# Patient Record
Sex: FEMALE | Race: WHITE | Employment: OTHER | URBAN - METROPOLITAN AREA
[De-identification: names, ages, dates, MRNs, and addresses within clinical notes are randomized per-mention and may not be internally consistent; named-entity substitution may affect disease eponyms.]

---

## 2017-01-04 ENCOUNTER — OFFICE VISIT (OUTPATIENT)
Dept: FAMILY MEDICINE CLINIC | Facility: CLINIC | Age: 72
End: 2017-01-04

## 2017-01-04 VITALS
DIASTOLIC BLOOD PRESSURE: 74 MMHG | HEIGHT: 60 IN | SYSTOLIC BLOOD PRESSURE: 134 MMHG | RESPIRATION RATE: 16 BRPM | HEART RATE: 82 BPM | BODY MASS INDEX: 21.4 KG/M2 | WEIGHT: 109 LBS | TEMPERATURE: 97.7 F | OXYGEN SATURATION: 97 %

## 2017-01-04 DIAGNOSIS — E78.5 HYPERLIPIDEMIA, UNSPECIFIED HYPERLIPIDEMIA TYPE: ICD-10-CM

## 2017-01-04 DIAGNOSIS — N20.0 RENAL CALCULI: ICD-10-CM

## 2017-01-04 DIAGNOSIS — B02.29 POSTHERPETIC NEURALGIA: Primary | ICD-10-CM

## 2017-01-04 RX ORDER — ACETAMINOPHEN/DIPHENHYDRAMINE 500MG-25MG
1 TABLET ORAL
COMMUNITY
End: 2017-04-07

## 2017-01-04 NOTE — PROGRESS NOTES
Ave Hoffman is a 80 y.o.  female presents today for office visit for back/stomach pain. Pt is in Room # 5.      1. Have you been to the ER, urgent care clinic since your last visit? Hospitalized since your last visit? No    2. Have you seen or consulted any other health care providers outside of the [address] [place] [address] since your last visit? Include any pap smears or colon screening.  No

## 2017-01-04 NOTE — PROGRESS NOTES
SUBJECTIVE:  Jeanne Castro is a 80y.o. year old female   Chief Complaint   Patient presents with    Follow-up       History of Present Illness: The singles rash is healing well. She finished Valtrex. The pain the zoster area is back. No fever, chills or N&V. She took Tylenol PM last night and since then the pain has subsided. Her nonspecific abdominal pain and constipation has resolved. Her BP was high in ER 9 days ago. Today it is better. She denies any H/O HTN. She is now following diet for lipids. She is going to take pravastatin. Past Medical History   Diagnosis Date    Calculus of kidney     H/O bronchitis      History reviewed. No pertinent past surgical history. Current Outpatient Prescriptions   Medication Sig    diphenhydrAMINE-acetaminophen (ACETAMINOPHEN PM)  mg tab Take 1 Tab by mouth two (2) times daily as needed for Pain.  pravastatin (PRAVACHOL) 20 mg tablet Take 1 Tab by mouth nightly. No current facility-administered medications for this visit. No Known Allergies     Family History   Problem Relation Age of Onset    Tremors Mother    [de-identified] Cancer Mother     No Known Problems Father     Emphysema Maternal Aunt     No Known Problems Maternal Uncle     No Known Problems Maternal Grandmother     Tremors Maternal Grandfather     Hypertension Son     Hypertension Son         Social History   Substance Use Topics    Smoking status: Former Smoker     Packs/day: 0.50     Years: 20.00     Quit date: 6/4/2015    Smokeless tobacco: Never Used    Alcohol use No       Review of Systems:   Constitutional: No fever, chills, night sweats, malaise, dizziness. Cardiovascular: No angina, palpitations, PND, orthopnea, lightheadedness, edema, claudication. Respiratory: No pleurisy, hemoptysis, unusual cough or sputum. Gastrointestinal: No nausea/ vomiting, KYE symptoms, melena, hematochezia.     Psychiatric: No agitation, confusion/disorientation, suicidal or homicidal ideation. Genitourinary: No dysuria, urgency, frequency, nocturia, hematuria, hesitancy, incontinence. Skin: otherwise no complaints. OBJECTIVE:  Physical Exam:   Constitutional: General Appearance:  well developed, well nourished, nontoxic, in no acute distress. Visit Vitals    /74 (BP 1 Location: Left arm, BP Patient Position: Sitting)    Pulse 82    Temp 97.7 °F (36.5 °C) (Oral)    Resp 16    Ht 5' (1.524 m)    Wt 109 lb (49.4 kg)    SpO2 97%    BMI 21.29 kg/m2     Pulmonary: Respiratory effort: normal; no dyspnea, no retractions, no accessory muscle use. Auscultation: normal & symmetrical air exchange; no rales, no rhonchi, no wheeze; no rubs  Cardiovascular: Palpation: PMI not displaced or enlarged, no thrills or heaves. Auscultation: RRR; no murmur, rubs or gallops. Extremities: no edema, no active varicosity. Gastrointestinal: Normal bowel sounds. No masses; no tenderness; no rebound/rigidity; no CVA tenderness. No hepatosplenomegaly. Psychiatric: Oriented to time, place and person. Musculoskeletal: NC/AT. Neck-supple. Skin: the Zosteriform rash on abdomen @ Lt T9 has healed.     Lab Results   Component Value Date/Time    WBC 8.5 12/26/2016 12:00 PM    HGB 16.0 12/26/2016 12:00 PM    HCT 45.8 12/26/2016 12:00 PM    PLATELET 803 11/66/0588 12:00 PM    MCV 85.3 12/26/2016 12:00 PM     Lab Results   Component Value Date/Time    Sodium 141 12/26/2016 01:15 PM    Potassium 4.0 12/26/2016 01:15 PM    Chloride 105 12/26/2016 01:15 PM    CO2 28 12/26/2016 01:15 PM    Anion gap 8 12/26/2016 01:15 PM    Glucose 92 12/26/2016 01:15 PM    BUN 17 12/26/2016 01:15 PM    Creatinine 0.83 12/26/2016 01:15 PM    BUN/Creatinine ratio 20 12/26/2016 01:15 PM    GFR est AA >60 12/26/2016 01:15 PM    GFR est non-AA >60 12/26/2016 01:15 PM    Calcium 9.0 12/26/2016 01:15 PM    Bilirubin, total 0.5 12/26/2016 01:15 PM    ALT 13 12/26/2016 01:15 PM    AST 15 12/26/2016 01:15 PM    Alk. phosphatase 73 12/26/2016 01:15 PM    Protein, total 7.7 12/26/2016 01:15 PM    Albumin 4.0 12/26/2016 01:15 PM    Globulin 3.7 12/26/2016 01:15 PM    A-G Ratio 1.1 12/26/2016 01:15 PM     Lab Results   Component Value Date/Time    Cholesterol, total 297 07/07/2016 12:16 PM    HDL Cholesterol 32 07/07/2016 12:16 PM    LDL, calculated 221 07/07/2016 12:16 PM    VLDL, calculated 44 07/07/2016 12:16 PM    Triglyceride 221 07/07/2016 12:16 PM     U/A- large blood. RBC 11-20. CT of abdomen/ pelvis (12/26/16): No acute findings to explain abdominal pain. Moderate amount of stool in the colon corresponding with reported constipation. ((KIDNEYS: Several bilateral renal cysts. Small nonobstructing calcification upper pole left kidney. Otherwise normal.))     ASSESSMENT:     1. Postherpetic neuralgia    2. Renal calculi    3. Hyperlipidemia, unspecified hyperlipidemia type    4. Elevated BP        PLAN:   Pharmacologic Management: Medications reviewed with the patient. Start on Pravastatin 20 nightly carefully. Continue PRN lactulose until seen by GI. Consider starting on Neurontin if pain returns. She is currently asymptomatic of renal stone- will follow up with . Orders Placed This Encounter    URINALYSIS W/ RFLX MICROSCOPIC    diphenhydrAMINE-acetaminophen (ACETAMINOPHEN PM)  mg tab     Other Instructions: Discussed DDx, follow-up & work-up. Discussed risk/benefit & side effect of treatment. Lipid lowering high fiber diet with fiber supplement discussed in details. Follow up visit as planned, prn sooner. Health risk from non adherence discussed. Patient voiced understanding. Follow-up Disposition:  Return in about 4 weeks (around 2/1/2017).     Gustabo Leung MD

## 2017-01-04 NOTE — MR AVS SNAPSHOT
Visit Information Date & Time Provider Department Dept. Phone Encounter #  
 1/4/2017  9:15 AM Liseth Meonn MD [facility] [phone] [id number] Follow-up Instructions Return in about 4 weeks (around 2/1/2017). Your Appointments 3/2/2017 10:30 AM  
Follow Up with Liseth Menno MD  
[facility] ([facility]) Appt Note: Return in about 2 months (around 2/28/2017) for fasting. [address] [place] [address] 83 [phone]  
[address] [facility] [address] Upcoming Health Maintenance Date Due DTaP/Tdap/Td series (1 - Tdap) 1/13/1967 ZOSTER VACCINE AGE 60> 1/13/2006 GLAUCOMA SCREENING Q2Y 1/13/2011 Pneumococcal 65+ Low/Medium Risk (1 of 2 - PCV13) 1/13/2011 INFLUENZA AGE 9 TO ADULT 8/1/2016 MEDICARE YEARLY EXAM 3/4/2017 FOBT Q 1 YEAR AGE 50-75 7/14/2017 BREAST CANCER SCRN MAMMOGRAM 3/10/2018 Allergies as of 1/4/2017  Review Complete On: 1/4/2017 By: Liseth Menon MD  
 No Known Allergies Current Immunizations  Never Reviewed No immunizations on file. Not reviewed this visit You Were Diagnosed With   
  
 Codes Comments Postherpetic neuralgia    -  Primary ICD-10-CM: B02.29 ICD-9-CM: 053.19 Renal calculi     ICD-10-CM: N20.0 ICD-9-CM: 592.0 Vitals BP Pulse Temp Resp Height(growth percentile) Weight(growth percentile) 134/74 (BP 1 Location: Left arm, BP Patient Position: Sitting) 82 97.7 °F (36.5 °C) (Oral) 16 5' (1.524 m) 109 lb (49.4 kg) SpO2 BMI OB Status Smoking Status 97% 21.29 kg/m2 Postmenopausal Former Smoker Vitals History BMI and BSA Data Body Mass Index Body Surface Area  
 21.29 kg/m 2 1.45 m 2 Preferred Pharmacy Pharmacy Name Phone [facility] [address] [phone] Your Updated Medication List  
  
   
This list is accurate as of: 1/4/17  9:58 AM.  Always use your most recent med list.  
  
  
  
  
 ACETAMINOPHEN PM  mg Tab Generic drug:  diphenhydrAMINE-acetaminophen Take 1 Tab by mouth two (2) times daily as needed for Pain.  
  
 pravastatin 20 mg tablet Commonly known as:  PRAVACHOL Take 1 Tab by mouth nightly. Follow-up Instructions Return in about 4 weeks (around 2/1/2017). To-Do List   
 01/04/2017 Lab:  URINALYSIS W/ RFLX MICROSCOPIC Introducing [facility]! [facility] introduces [employer] patient portal. Now you can access parts of your medical record, email your doctor's office, and request medication refills online. 1. In your internet browser, go to [email or web address] 2. Click on the First Time User? Click Here link in the Sign In box. You will see the New Member Sign Up page. 3. Enter your [employer] Access Code exactly as it appears below. You will not need to use this code after youve completed the sign-up process. If you do not sign up before the expiration date, you must request a new code. · [employer] Access Code: [email or web address] Expires: 3/13/2017 10:10 AM 
 
4. Enter the last four digits of your Social Security Number (xxxx) and Date of Birth (mm/dd/yyyy) as indicated and click Submit. You will be taken to the next sign-up page. 5. Create a [employer] ID. This will be your [employer] login ID and cannot be changed, so think of one that is secure and easy to remember. 6. Create a [employer] password. You can change your password at any time. 7. Enter your Password Reset Question and Answer. This can be used at a later time if you forget your password. 8. Enter your e-mail address. You will receive e-mail notification when new information is available in [address] 9. Click Sign Up. You can now view and download portions of your medical record. 10. Click the Download Summary menu link to download a portable copy of your medical information. If you have questions, please visit the Frequently Asked Questions section of the [employer] website. Remember, [employer] is NOT to be used for urgent needs. For medical emergencies, dial 911. Now available from your iPhone and Android! Please provide this summary of care documentation to your next provider. Your primary care clinician is listed as [address] [place] [address]. If you have any questions after today's visit, please call [phone].

## 2017-01-05 ENCOUNTER — TELEPHONE (OUTPATIENT)
Dept: FAMILY MEDICINE CLINIC | Facility: CLINIC | Age: 72
End: 2017-01-05

## 2017-01-05 LAB
APPEARANCE UR: CLEAR
BACTERIA #/AREA URNS HPF: ABNORMAL /[HPF]
BILIRUB UR QL STRIP: NEGATIVE
CASTS URNS QL MICRO: ABNORMAL /LPF
COLOR UR: YELLOW
EPI CELLS #/AREA URNS HPF: ABNORMAL /HPF
GLUCOSE UR QL: NEGATIVE
HGB UR QL STRIP: ABNORMAL
KETONES UR QL STRIP: NEGATIVE
LEUKOCYTE ESTERASE UR QL STRIP: NEGATIVE
MICRO URNS: ABNORMAL
MUCOUS THREADS URNS QL MICRO: PRESENT
NITRITE UR QL STRIP: NEGATIVE
PH UR STRIP: 6.5 [PH] (ref 5–7.5)
PROT UR QL STRIP: NEGATIVE
RBC #/AREA URNS HPF: ABNORMAL /HPF
SP GR UR: 1.01 (ref 1–1.03)
UROBILINOGEN UR STRIP-MCNC: 0.2 MG/DL (ref 0.2–1)
WBC #/AREA URNS HPF: ABNORMAL /HPF

## 2017-01-05 NOTE — PROGRESS NOTES
Urine is improved; but there is still minimal blood. This is likely due to kidney stone. Need to f/u with urologist as advised.

## 2017-01-05 NOTE — TELEPHONE ENCOUNTER
Spoke with pt in regards to lab results. Relayed 's notes. Pt acknowledges understanding and voices no concerns at this time.

## 2017-01-24 ENCOUNTER — OFFICE VISIT (OUTPATIENT)
Dept: UROLOGY | Age: 72
End: 2017-01-24

## 2017-01-24 VITALS
DIASTOLIC BLOOD PRESSURE: 71 MMHG | BODY MASS INDEX: 21.2 KG/M2 | HEART RATE: 80 BPM | WEIGHT: 108 LBS | SYSTOLIC BLOOD PRESSURE: 150 MMHG | HEIGHT: 60 IN | OXYGEN SATURATION: 96 %

## 2017-01-24 DIAGNOSIS — R31.29 MICROSCOPIC HEMATURIA: ICD-10-CM

## 2017-01-24 DIAGNOSIS — N20.0 KIDNEY STONES: Primary | ICD-10-CM

## 2017-01-24 DIAGNOSIS — M47.9 DEGENERATIVE JOINT DISEASE OF LOW BACK: ICD-10-CM

## 2017-01-24 LAB
BILIRUB UR QL STRIP: NEGATIVE
GLUCOSE UR-MCNC: NEGATIVE MG/DL
KETONES P FAST UR STRIP-MCNC: NEGATIVE MG/DL
PH UR STRIP: 5.5 [PH] (ref 4.6–8)
PROT UR QL STRIP: NEGATIVE MG/DL
SP GR UR STRIP: 1.01 (ref 1–1.03)
UA UROBILINOGEN AMB POC: NORMAL (ref 0.2–1)
URINALYSIS CLARITY POC: CLEAR
URINALYSIS COLOR POC: YELLOW
URINE BLOOD POC: NORMAL
URINE LEUKOCYTES POC: NEGATIVE
URINE NITRITES POC: NEGATIVE

## 2017-01-24 RX ORDER — LACTULOSE 10 G/15ML
SOLUTION ORAL; RECTAL AS NEEDED
COMMUNITY
End: 2017-10-06

## 2017-01-24 NOTE — MR AVS SNAPSHOT
Visit Information Date & Time Provider Department Dept. Phone Encounter #  
 1/24/2017 10:30 AM Lan Beyer, [place] [facility] [phone] [id number] Your Appointments 2/21/2017 10:30 AM  
Office Visit with Lan Beyer MD  
[facility]) Appt Note: check up [address] [address]  
[phone] [address] [phone]  
  
    
 3/2/2017 10:30 AM  
Follow Up with MD Anaya Pozo [address] ([facility]) Appt Note: Return in about 2 months (around 2/28/2017) for fasting. [address] [place], [place] [address] [phone]  
[address] [place] [address] Upcoming Health Maintenance Date Due DTaP/Tdap/Td series (1 - Tdap) 1/13/1967 ZOSTER VACCINE AGE 60> 1/13/2006 GLAUCOMA SCREENING Q2Y 1/13/2011 Pneumococcal 65+ Low/Medium Risk (1 of 2 - PCV13) 1/13/2011 INFLUENZA AGE 9 TO ADULT 8/1/2016 MEDICARE YEARLY EXAM 3/4/2017 FOBT Q 1 YEAR AGE 50-75 7/14/2017 BREAST CANCER SCRN MAMMOGRAM 3/10/2018 Allergies as of 1/24/2017  Review Complete On: 1/24/2017 By: Bella Rubi LPN No Known Allergies Current Immunizations  Never Reviewed No immunizations on file. Not reviewed this visit You Were Diagnosed With   
  
 Codes Comments Kidney stones    -  Primary ICD-10-CM: N20.0 ICD-9-CM: 592.0 Microscopic hematuria     ICD-10-CM: R31.29 ICD-9-CM: 599.72 Vitals BP Pulse Height(growth percentile) Weight(growth percentile) SpO2 BMI  
 150/71 (BP 1 Location: Left arm, BP Patient Position: Sitting) 80 5' (1.524 m) 108 lb (49 kg) 96% 21.09 kg/m2 OB Status Smoking Status Postmenopausal Former Smoker Vitals History BMI and BSA Data Body Mass Index Body Surface Area  21.09 kg/m 2 1.44 m 2  
  
  
 Preferred Pharmacy Pharmacy Name Phone [facility] [place], [address] [phone] Your Updated Medication List  
  
   
This list is accurate as of: 17 11:44 AM.  Always use your most recent med list.  
  
  
  
  
 ACETAMINOPHEN PM  mg Tab Generic drug:  diphenhydrAMINE-acetaminophen Take 1 Tab by mouth two (2) times daily as needed for Pain. GENERLAC 10 gram/15 mL solution Generic drug:  lactulose Take  by mouth three (3) times daily. pravastatin 20 mg tablet Commonly known as:  PRAVACHOL Take 1 Tab by mouth nightly. We Performed the Following AMB POC URINALYSIS DIP STICK AUTO W/O MICRO [92[address] CPT(R)] Patient Instructions [email or web address] Activation Thank you for requesting access to [facility]. Please follow the instructions below to securely access and download your online medical record. [facility] allows you to send messages to your doctor, view your test results, renew your prescriptions, schedule appointments, and more. How Do I Sign Up? 1. In your internet browser, go to [email or web address] 2. Click on the First Time User? Click Here link in the Sign In box. You will see the New Member Sign Up page. 3. Enter your [facility] Access Code exactly as it appears below. You will not need to use this code after youve completed the sign-up process. If you do not sign up before the expiration date, you must request a new code. [facility] Access Code: [email or web address] Expires: 3/13/2017 10:10 AM (This is the date your [facility] access code will ) 4. Enter the last four digits of your Social Security Number (xxxx) and Date of Birth (mm/dd/yyyy) as indicated and click Submit. You will be taken to the next sign-up page. 5. Create a [facility] ID.  This will be your [facility] login ID and cannot be changed, so think of one that is secure and easy to remember. 6. Create a [employer]i password. You can change your password at any time. 7. Enter your Password Reset Question and Answer. This can be used at a later time if you forget your password. 8. Enter your e-mail address. You will receive e-mail notification when new information is available in [address] 9. Click Sign Up. You can now view and download portions of your medical record. 10. Click the Download Summary menu link to download a portable copy of your medical information. Additional Information If you have questions, please visit the Frequently Asked Questions section of the [employer] website at [email or web address] Remember, [employer] is NOT to be used for urgent needs. For medical emergencies, dial 911. Kidney Stone: Care Instructions Your Care Instructions Kidney stones are formed when salts, minerals, and other substances normally found in the urine clump together. They can be as small as grains of sand or, rarely, as large as golf balls. While the stone is traveling through the ureter, which is the tube that carries urine from the kidney to the bladder, you will probably feel pain. The pain may be mild or very severe. You may also have some blood in your urine. As soon as the stone reaches the bladder, any intense pain should go away. If a stone is too large to pass on its own, you may need a medical procedure to help you pass the stone. The doctor has checked you carefully, but problems can develop later. If you notice any problems or new symptoms, get medical treatment right away. Follow-up care is a key part of your treatment and safety. Be sure to make and go to all appointments, and call your doctor if you are having problems. It's also a good idea to know your test results and keep a list of the medicines you take. How can you care for yourself at home? · Drink plenty of fluids, enough so that your urine is light yellow or clear like water. If you have kidney, heart, or liver disease and have to limit fluids, talk with your doctor before you increase the amount of fluids you drink. · Take pain medicines exactly as directed. Call your doctor if you think you are having a problem with your medicine. ¨ If the doctor gave you a prescription medicine for pain, take it as prescribed. ¨ If you are not taking a prescription pain medicine, ask your doctor if you can take an over-the-counter medicine. Read and follow all instructions on the label. · Your doctor may ask you to strain your urine so that you can collect your kidney stone when it passes. You can use a kitchen strainer or a tea strainer to catch the stone. Store it in a plastic bag until you see your doctor again. Preventing future kidney stones Some changes in your diet may help prevent kidney stones. Depending on the cause of your stones, your doctor may recommend that you: · Drink plenty of fluids, enough so that your urine is light yellow or clear like water. If you have kidney, heart, or liver disease and have to limit fluids, talk with your doctor before you increase the amount of fluids you drink. · Limit coffee, tea, and alcohol. Also avoid grapefruit juice. · Do not take more than the recommended daily dose of vitamins C and D. 
· Avoid antacids such as Gaviscon, Maalox, Mylanta, or Tums. · Limit the amount of salt (sodium) in your diet. · Eat a balanced diet that is not too high in protein. · Limit foods that are high in a substance called oxalate, which can cause kidney stones. These foods include dark green vegetables, rhubarb, chocolate, wheat bran, nuts, cranberries, and beans. When should you call for help? Call your doctor now or seek immediate medical care if: 
· You cannot keep down fluids. · Your pain gets worse. · You have a fever or chills. · You have new or worse pain in your back just below your rib cage (the flank area). · You have new or more blood in your urine. Watch closely for changes in your health, and be sure to contact your doctor if: 
· You do not get better as expected. Where can you learn more? Go to http://zia-jono.info/. Enter T270 in the search box to learn more about \"Kidney Stone: Care Instructions. \" Current as of: November 20, 2015 Content Version: 11.1 © 8952-0896 [employer]. Care instructions adapted under license by [employer] (which disclaims liability or warranty for this information). If you have questions about a medical condition or this instruction, always ask your healthcare professional. [address] any warranty or liability for your use of this information. Introducing [facility]! [facility] introduces [employer] patient portal. Now you can access parts of your medical record, email your doctor's office, and request medication refills online. 1. In your internet browser, go to [email or web address] 2. Click on the First Time User? Click Here link in the Sign In box. You will see the New Member Sign Up page. 3. Enter your A[employer] Access Code exactly as it appears below. You will not need to use this code after youve completed the sign-up process. If you do not sign up before the expiration date, you must request a new code. · [employer] Access Code: [email or web address] Expires: 3/13/2017 10:10 AM 
 
4. Enter the last four digits of your Social Security Number (xxxx) and Date of Birth (mm/dd/yyyy) as indicated and click Submit. You will be taken to the next sign-up page. 5. Create a Atterot ID. This will be your A[employer]ctions login ID and cannot be changed, so think of one that is secure and easy to remember. 6. Create a Atterot password. You can change your password at any time. 7. Enter your Password Reset Question and Answer. This can be used at a later time if you forget your password. 8. Enter your e-mail address. You will receive e-mail notification when new information is available in [address] 9. Click Sign Up. You can now view and download portions of your medical record. 10. Click the Download Summary menu link to download a portable copy of your medical information. If you have questions, please visit the Frequently Asked Questions section of the [employer] cities website. Remember, [employer] is NOT to be used for urgent needs. For medical emergencies, dial 911. Now available from your iPhone and Android! Please provide this summary of care documentation to your next provider. Your primary care clinician is listed as [address] [place] [address]. If you have any questions after today's visit, please call [phone].

## 2017-01-24 NOTE — PATIENT INSTRUCTIONS
Practice Management e-Tools Activation    Thank you for requesting access to Practice Management e-Tools. Please follow the instructions below to securely access and download your online medical record. Practice Management e-Tools allows you to send messages to your doctor, view your test results, renew your prescriptions, schedule appointments, and more. How Do I Sign Up? 1. In your internet browser, go to [email or web address]. 2. Click on the First Time User? Click Here link in the Sign In box. You will see the New Member Sign Up page. 3. Enter your Practice Management e-Tools Access Code exactly as it appears below. You will not need to use this code after youve completed the sign-up process. If you do not sign up before the expiration date, you must request a new code. Practice Management e-Tools Access Code: [email or web address]  Expires: 3/13/2017 10:10 AM (This is the date your Practice Management e-Tools access code will )    4. Enter the last four digits of your Social Security Number (xxxx) and Date of Birth (mm/dd/yyyy) as indicated and click Submit. You will be taken to the next sign-up page. 5. Create a Practice Management e-Tools ID. This will be your Practice Management e-Tools login ID and cannot be changed, so think of one that is secure and easy to remember. 6. Create a Practice Management e-Tools password. You can change your password at any time. 7. Enter your Password Reset Question and Answer. This can be used at a later time if you forget your password. 8. Enter your e-mail address. You will receive e-mail notification when new information is available in [address]. 9. Click Sign Up. You can now view and download portions of your medical record. 10. Click the Download Summary menu link to download a portable copy of your medical information. Additional Information    If you have questions, please visit the Frequently Asked Questions section of the Practice Management e-Tools website at [email or web address] Remember, Practice Management e-Tools is NOT to be used for urgent needs. For medical emergencies, dial 911.            Kidney Stone: Care Instructions  Your Care Instructions    Kidney stones are formed when salts, minerals, and other substances normally found in the urine clump together. They can be as small as grains of sand or, rarely, as large as golf balls. While the stone is traveling through the ureter, which is the tube that carries urine from the kidney to the bladder, you will probably feel pain. The pain may be mild or very severe. You may also have some blood in your urine. As soon as the stone reaches the bladder, any intense pain should go away. If a stone is too large to pass on its own, you may need a medical procedure to help you pass the stone. The doctor has checked you carefully, but problems can develop later. If you notice any problems or new symptoms, get medical treatment right away. Follow-up care is a key part of your treatment and safety. Be sure to make and go to all appointments, and call your doctor if you are having problems. It's also a good idea to know your test results and keep a list of the medicines you take. How can you care for yourself at home? · Drink plenty of fluids, enough so that your urine is light yellow or clear like water. If you have kidney, heart, or liver disease and have to limit fluids, talk with your doctor before you increase the amount of fluids you drink. · Take pain medicines exactly as directed. Call your doctor if you think you are having a problem with your medicine. ¨ If the doctor gave you a prescription medicine for pain, take it as prescribed. ¨ If you are not taking a prescription pain medicine, ask your doctor if you can take an over-the-counter medicine. Read and follow all instructions on the label. · Your doctor may ask you to strain your urine so that you can collect your kidney stone when it passes. You can use a kitchen strainer or a tea strainer to catch the stone. Store it in a plastic bag until you see your doctor again.   Preventing future kidney stones  Some changes in your diet may help prevent kidney stones. Depending on the cause of your stones, your doctor may recommend that you:  · Drink plenty of fluids, enough so that your urine is light yellow or clear like water. If you have kidney, heart, or liver disease and have to limit fluids, talk with your doctor before you increase the amount of fluids you drink. · Limit coffee, tea, and alcohol. Also avoid grapefruit juice. · Do not take more than the recommended daily dose of vitamins C and D.  · Avoid antacids such as Gaviscon, Maalox, Mylanta, or Tums. · Limit the amount of salt (sodium) in your diet. · Eat a balanced diet that is not too high in protein. · Limit foods that are high in a substance called oxalate, which can cause kidney stones. These foods include dark green vegetables, rhubarb, chocolate, wheat bran, nuts, cranberries, and beans. When should you call for help? Call your doctor now or seek immediate medical care if:  · You cannot keep down fluids. · Your pain gets worse. · You have a fever or chills. · You have new or worse pain in your back just below your rib cage (the flank area). · You have new or more blood in your urine. Watch closely for changes in your health, and be sure to contact your doctor if:  · You do not get better as expected. Where can you learn more? Go to http://zia-jono.info/. Enter R815 in the search box to learn more about \"Kidney Stone: Care Instructions. \"  Current as of: November 20, 2015  Content Version: 11.1  © 1290-4618 [employer]. Care instructions adapted under license by [employer] (which disclaims liability or warranty for this information). If you have questions about a medical condition or this instruction, always ask your healthcare professional. [address] any warranty or liability for your use of this information.

## 2017-01-24 NOTE — PROGRESS NOTES
Ms. Kayy Wagner has a reminder for a \"due or due soon\" health maintenance. I have asked that she contact her primary care provider for follow-up on this health maintenance.

## 2017-01-24 NOTE — PROGRESS NOTES
Chief Complaint   Patient presents with    New Patient    Kidney Stone    Microscopic Hematuria       HISTORY OF PRESENT ILLNESS:  Steve Sullivan is a 71 y.o. female comes in today having been seen earlier for severe back pain, abdominal pain, chronic constipation. As part of her workup, she had a CT urogram.  This study showed some small subcentimeter cysts in each kidney and some small calyceal stones. On my evaluation of the study, she also has suggestive demineralization of her back and clear loss of disc space between L4 and L5. No gross hematuria here in the office and she has no significant urinary symptoms. She has not had any previous stone disease that she is aware of. She is however a prior smoker and has been found to have some moderate microhematuria. Past Medical History   Diagnosis Date    Calculus of kidney     H/O bronchitis     Shingles        History reviewed. No pertinent past surgical history. Social History   Substance Use Topics    Smoking status: Former Smoker     Packs/day: 0.50     Years: 20.00     Quit date: 6/4/2015    Smokeless tobacco: Never Used    Alcohol use No       No Known Allergies    Family History   Problem Relation Age of Onset    Tremors Mother     Cancer Mother     No Known Problems Father     Emphysema Maternal Aunt     No Known Problems Maternal Uncle     No Known Problems Maternal Grandmother     Tremors Maternal Grandfather     Hypertension Son     Hypertension Son        Current Outpatient Prescriptions   Medication Sig Dispense Refill    lactulose (GENERLAC) 10 gram/15 mL solution Take  by mouth three (3) times daily.  diphenhydrAMINE-acetaminophen (ACETAMINOPHEN PM)  mg tab Take 1 Tab by mouth two (2) times daily as needed for Pain.  pravastatin (PRAVACHOL) 20 mg tablet Take 1 Tab by mouth nightly. 30 Tab 2           REVIEW OF SYSTEMS:   Documented on the chart.       PHYSICAL EXAMINATION:     Visit Vitals    /71 (BP 1 Location: Left arm, BP Patient Position: Sitting)    Pulse 80    Ht 5' (1.524 m)    Wt 108 lb (49 kg)    SpO2 96%    BMI 21.09 kg/m2     Constitutional: Well developed, well-nourished female in no acute distress. CV:  No peripheral swelling noted  Respiratory: No respiratory distress or difficulties  Abdomen:  Soft and nontender. No masses. No hepatosplenomegaly.  Female:  No CVA tenderness. Skin:  Normal color. No evidence of jaundice. Neuro/Psych:  Patient with appropriate affect. Alert and oriented. Lymphatic:   No enlargement of supraclavicular lymph nodes. Results for orders placed or performed in visit on 01/24/17   AMB POC URINALYSIS DIP STICK AUTO W/O MICRO   Result Value Ref Range    Color (UA POC) Yellow     Clarity (UA POC) Clear     Glucose (UA POC) Negative Negative    Bilirubin (UA POC) Negative Negative    Ketones (UA POC) Negative Negative    Specific gravity (UA POC) 1.015 1.001 - 1.035    Blood (UA POC) 2+ Negative    pH (UA POC) 5.5 4.6 - 8.0    Protein (UA POC) Negative Negative mg/dL    Urobilinogen (UA POC) 0.2 mg/dL 0.2 - 1    Nitrites (UA POC) Negative Negative    Leukocyte esterase (UA POC) Negative Negative         REVIEW OF LABS AND IMAGING:      Imaging Report Reviewed? YES      Images Reviewed? YES           Other Lab Data Reviewed? YES    ASSESSMENT:     ICD-10-CM ICD-9-CM    1. Kidney stones N20.0 592.0 AMB POC URINALYSIS DIP STICK AUTO W/O MICRO   2. Microscopic hematuria R31.29 599.72 AMB POC URINALYSIS DIP STICK AUTO W/O MICRO      CYTOLOGY NON-GYN   3. Degenerative joint disease of low back M47.9 721.90                 PLAN/DISCUSSION: I have reviewed her CT scan with her and shown her the defects mentioned on it and I have also shown her back problems to her. In summary I think she needs to keep up with her pending gastroenterology consult and probably have a colonoscopy to explain her lower abdominal pains and constipation.   As far as her urinary tract is concerned, I do not see anything significantly wrong with either kidney and I see no signs of any obstructive uropathy suggestive of a ureteral stone. However because of her long history of smoking and her microhematuria I am going to send her urine off for some urinary cytology studies and I am going to see her back in about 3 or 4 weeks and recheck her urine. I think regardless of the pathology results, she is going to need cystoscopy because of her smoking history. Patient voices understanding and agreement to the plan. Tomeka Jack MD on 1/24/2017           Please note: This document has been produced using voice recognition software. Unrecognized errors in transcription may be present.

## 2017-01-30 ENCOUNTER — PATIENT OUTREACH (OUTPATIENT)
Dept: FAMILY MEDICINE CLINIC | Facility: CLINIC | Age: 72
End: 2017-01-30

## 2017-01-30 NOTE — PROGRESS NOTES
Patient was admitted to [facility] [place] 12/26/16 -12/26/16 for Constipation. Reached patient on phone and verified identity using name and date of birth. Introduced self/role and purpose of call. Patient's sounds in a hurry to end the phone conversation. Patient stated \" I'm doing fine\". Patient denied Chest Pain, Shortness of breath. Abdominal Pain, Nausea and Vomiting. Noted no hospitalization  admission post 30 days from discharge date 12/26/16. This episode is closed. ED/Hospital episode resolved. Opportunity to ask questions was provided. Contact information was provided for future reference, assistance, concerns, or further questions. Patient voiced  other  concern, needs, and/or assistance at this time.

## 2017-01-31 ENCOUNTER — PATIENT OUTREACH (OUTPATIENT)
Dept: FAMILY MEDICINE CLINIC | Facility: CLINIC | Age: 72
End: 2017-01-31

## 2017-01-31 NOTE — PROGRESS NOTES
Patient is requesting writer to call and schedule her an appoinmentt to see her gastro doctor. Patient stated \" I don't like making appointment\". Called Dr. Dayana Pedro office and spoke to Ms. Jessica Heller. Appointment is schedule on 2/10/17 at 2:10 pm. Called patient to make her aware of schedule appointment. Instructed patient to arrived at her appointment at 1:50pm  and to ensure to bring her current medication lists, Photo ID, and insurance card. Patient verbalized understanding.

## 2017-04-07 ENCOUNTER — OFFICE VISIT (OUTPATIENT)
Dept: FAMILY MEDICINE CLINIC | Facility: CLINIC | Age: 72
End: 2017-04-07

## 2017-04-07 VITALS
SYSTOLIC BLOOD PRESSURE: 108 MMHG | RESPIRATION RATE: 15 BRPM | BODY MASS INDEX: 20.81 KG/M2 | DIASTOLIC BLOOD PRESSURE: 78 MMHG | WEIGHT: 106 LBS | OXYGEN SATURATION: 97 % | HEART RATE: 76 BPM | TEMPERATURE: 97.8 F | HEIGHT: 60 IN

## 2017-04-07 DIAGNOSIS — Z00.00 MEDICARE ANNUAL WELLNESS VISIT, SUBSEQUENT: ICD-10-CM

## 2017-04-07 DIAGNOSIS — B02.29 POSTHERPETIC NEURALGIA: ICD-10-CM

## 2017-04-07 DIAGNOSIS — E78.5 HYPERLIPIDEMIA, UNSPECIFIED HYPERLIPIDEMIA TYPE: Primary | ICD-10-CM

## 2017-04-07 NOTE — MR AVS SNAPSHOT
Visit Information Date & Time Provider Department Dept. Phone Encounter #  
 4/7/2017  9:30 AM Kateryna Lopez MD [facility] [phone] [id number] Upcoming Health Maintenance Date Due DTaP/Tdap/Td series (1 - Tdap) 1/13/1966 MEDICARE YEARLY EXAM 3/4/2017 FOBT Q 1 YEAR AGE 50-75 7/14/2017 BREAST CANCER SCRN MAMMOGRAM 3/10/2018 Pneumococcal 65+ Low/Medium Risk (2 of 2 - PPSV23) 4/7/2018 GLAUCOMA SCREENING Q2Y 4/7/2019 Allergies as of 4/7/2017  Review Complete On: 4/7/2017 By: Kateryna Lopez MD  
 No Known Allergies Current Immunizations  Never Reviewed No immunizations on file. Not reviewed this visit You Were Diagnosed With   
  
 Codes Comments Hyperlipidemia, unspecified hyperlipidemia type    -  Primary ICD-10-CM: E78.5 ICD-9-CM: 272.4 Vitals BP Pulse Temp Resp Height(growth percentile) Weight(growth percentile) 108/78 (BP 1 Location: Left arm, BP Patient Position: Sitting) 76 97.8 °F (36.6 °C) (Oral) 15 5' (1.524 m) 106 lb (48.1 kg) SpO2 BMI OB Status Smoking Status 97% 20.7 kg/m2 Postmenopausal Former Smoker BMI and BSA Data Body Mass Index Body Surface Area 20.7 kg/m 2 1.43 m 2 Preferred Pharmacy Pharmacy Name Phone [facility] [address] [phone] Your Updated Medication List  
  
   
This list is accurate as of: 4/7/17 10:12 AM.  Always use your most recent med list.  
  
  
  
  
 GENERLAC 10 gram/15 mL solution Generic drug:  lactulose Take  by mouth three (3) times daily. pravastatin 20 mg tablet Commonly known as:  PRAVACHOL Take 1 Tab by mouth nightly. To-Do List   
 04/07/2017 Lab:  ALT   
  
 04/07/2017 Lab:  AST   
  
 04/07/2017 Lab:  LIPID PANEL Patient Instructions Schedule of Personalized Health Plan for Arabella Cruz (Provide Copy to Patient) 04/07/17 The best way to stay healthy is to live a healthy lifestyle. A healthy lifestyle includes regular exercise, eating a well-balanced diet, keeping a healthy weight and not smoking. Regular physical exams and screening tests are another important way to take care of yourself. Preventive exams provided by health care providers can find health problems early when treatment works best and can keep you from getting certain diseases or illnesses. Preventive services include exams, lab tests, screenings, shots, monitoring and information to help you take care of your own health. All people over 65 should have a pneumonia shot. Pneumonia shots are usually only needed once in a lifetime unless your doctor decides differently. All people over 65 should have a yearly flu shot. People over 65 are at medium to high risk for Hepatitis B. Three shots are needed for complete protection. In addition to your physical exam, some screening tests are recommended: 
 
 
Screening for Breast Cancer is recommended yearly with a mammogram. 
 
Screening for Cervical Cancer is recommended every two years (annually for certain risk factors, such as previous history of STD or abnormal PAP in past 7 years), with a Pelvic Exam with PAP 
 
 Here is a list of your current Health Maintenance items with a due date: 
Health Maintenance Topic Date Due  
 DTaP/Tdap/Td series (1 - Tdap) 01/13/1966  MEDICARE YEARLY EXAM  03/04/2017  
 FOBT Q 1 YEAR AGE 50-75  07/14/2017  BREAST CANCER SCRN MAMMOGRAM  03/10/2018  Pneumococcal 65+ Low/Medium Risk (2 of 2 - PPSV23) 04/07/2018  GLAUCOMA SCREENING Q2Y  04/07/2019  Hepatitis C Screening  Completed  OSTEOPOROSIS SCREENING (DEXA)  Completed  ZOSTER VACCINE AGE 60>  Addressed  INFLUENZA AGE 9 TO ADULT  Addressed Advance Care Planning: Care Instructions Your Care Instructions It can be hard to live with an illness that cannot be cured. But if your health is getting worse, you may want to make decisions about end-of-life care. Planning for the end of your life does not mean that you are giving up. It is a way to make sure that your wishes are met. Clearly stating your wishes can make it easier for your loved ones. Making plans while you are still able may also ease your mind and make your final days less stressful and more meaningful. Follow-up care is a key part of your treatment and safety. Be sure to make and go to all appointments, and call your doctor if you are having problems. It's also a good idea to know your test results and keep a list of the medicines you take. What can you do to plan for the end of life? · You can bring these issues up with your doctor. You do not need to wait until your doctor starts the conversation. You might start with \"I would not be willing to live with . Yousif Alcantar \" When you complete this sentence it helps your doctor understand your wishes. · Talk openly and honestly with your doctor. This is the best way to understand the decisions you will need to make as your health changes. Know that you can always change your mind. · Ask your doctor about commonly used life-support measures.  These include tube feedings, breathing machines, and fluids given through a vein (IV). Understanding these treatments will help you decide whether you want them. · You may choose to have these life-supporting treatments for a limited time. This allows a trial period to see whether they will help you. You may also decide that you want your doctor to take only certain measures to keep you alive. It is important to spell out these conditions so that your doctor and family understand them. · Talk to your doctor about how long you are likely to live. He or she may be able to give you an idea of what usually happens with your specific illness. · Think about preparing papers that state your wishes. This way there will not be any confusion about what you want. You can change your instructions at any time. Which papers should you prepare? Advance directives are legal papers that tell doctors how you want to be cared for at the end of your life. You do not need a  to write these papers. Ask your doctor or your state health department for information on how to write your advance directives. They may have the forms for each of these types of papers. Make sure your doctor has a copy of these on file, and give a copy to a family member or close friend. · Consider a do-not-resuscitate order (DNR). This order asks that no extra treatments be done if your heart stops or you stop breathing. Extra treatments may include cardiopulmonary resuscitation (CPR), electrical shock to restart your heart, or a machine to breathe for you. If you decide to have a DNR order, ask your doctor to explain and write it. Place the order in your home where everyone can easily see it. · Consider a living will. A living will explains your wishes about life support and other treatments at the end of your life if you become unable to speak for yourself.  Living cortez tell doctors to use or not use treatments that would keep you alive. You must have one or two witnesses or a notary present when you sign this form. · Consider a durable power of  for health care. This allows you to name a person to make decisions about your care if you are not able to. Most people ask a close friend or family member. Talk to this person about the kinds of treatments you want and those that you do not want. Make sure this person understands your wishes. These legal papers are simple to change. Tell your doctor what you want to change, and ask him or her to make a note in your medical file. Give your family updated copies of the papers. Where can you learn more? Go to http://zia-jono.info/. Enter P184 in the search box to learn more about \"Advance Care Planning: Care Instructions. \" Current as of: November 17, 2016 Content Version: 11.2 © 3231-0629 [employer]. Care instructions adapted under license by [employer] (which disclaims liability or warranty for this information). If you have questions about a medical condition or this instruction, always ask your healthcare professional. [address] any warranty or liability for your use of this information. Deciding About Life-Prolonging Treatment What is life-prolonging treatment? There are many kinds of treatment that can help you live longer. These may be needed for only a short time until your illness improves. Or you may use them over the long term to help keep you alive. Some treatments include the use of: · Medicines to slow the progress of certain diseases, such as heart disease, diabetes, cancer, AIDS, or Alzheimer's disease. · Antibiotics to treat serious infections, such as pneumonia. · Dialysis to clean your blood if your kidneys stop working.  
· A breathing machine to help you breathe if you can't breathe on your own. This machine pumps air into your lungs through a tube put into your throat. · A feeding tube or an intravenous (IV) line to give you food and fluids if you can't eat or drink. · Cardiopulmonary resuscitation (CPR) to try to restart your heart. The decision to receive treatments that may help you live longer is a personal one. You may want your doctor to do everything possible to keep you alive, even when your chance for recovery is small. Or you may choose to only have care to manage your pain and other symptoms. What are key points about this decision? · If there is a good chance that your illness can be cured or managed, your doctor may advise you to first try available treatments. If these don't work, then you might think about stopping treatment. · If you stop treatment, you will still receive care that focuses on pain relief and comfort. · A decision to stop treatment that keeps you alive does not have to be permanent. You can always change your mind if your health starts to improve. · Even though treatment focuses on helping you live longer, it may cause side effects that can greatly affect your quality of life. And it could affect how you spend time with your family and friends. · If you still have personal goals that you want to pursue, you may want treatment that keeps you alive long enough to reach them. Why might you choose life-prolonging treatment? · There is a good chance that your illness can be cured or managed. · You think you can manage the possible side effects of treatment. · You don't think treatment will get in the way of your quality of life. · You have personal goals that you still want to pursue and achieve. Why might you choose to stop life-prolonging treatment? · Your chance of surviving your illness is very low. · You have tried all possible treatments for your illness, but they have not helped. · You can no longer deal with the side effects of treatment. · You have already met the goals you set out to achieve in your life. Your decision Thinking about the facts and your feelings can help you make a decision that is right for you. Be sure you understand the benefits and risks of your options, and think about what else you need to do before you make the decision. Where can you learn more? Go to http://zia-jono.info/. Enter O354 in the search box to learn more about \"Deciding About Life-Prolonging Treatment. \" Current as of: February 24, 2016 Content Version: 11.2 © 7932-5724 [employer]. Care instructions adapted under license by [employer] (which disclaims liability or warranty for this information). If you have questions about a medical condition or this instruction, always ask your healthcare professional. [address] any warranty or liability for your use of this information. [address] What is a living will? A living will is a legal form you use to write down the kind of care you want at the end of your life. It is used by the health professionals who will treat you if you aren't able to decide for yourself. If you put your wishes in writing, your loved ones and others will know what kind of care you want. They won't need to guess. This can ease your mind and be helpful to others. A living will is not the same as an estate or property will. An estate will explains what you want to happen with your money and property after you die. Is a living will a legal document? A living will is a legal document. Each state has its own laws about living cortez. If you move to another state, make sure that your living will is legal in the state where you now live. Or you might use a universal form that has been approved by many states. This kind of form can sometimes be completed and stored online.  Your electronic copy will then be available wherever you have a connection to the Internet. In most cases, doctors will respect your wishes even if you have a form from a different state. · You don't need an  to complete a living will. But legal advice can be helpful if your state's laws are unclear, your health history is complicated, or your family can't agree on what should be in your living will. · You can change your living will at any time. Some people find that their wishes about end-of-life care change as their health changes. · In addition to making a living will, think about completing a medical power of  form. This form lets you name the person you want to make end-of-life treatment decisions for you (your \"health care agent\") if you're not able to. Many hospitals and nursing homes will give you the forms you need to complete a living will and a medical power of . · Your living will is used only if you can't make or communicate decisions for yourself anymore. If you become able to make decisions again, you can accept or refuse any treatment, no matter what you wrote in your living will. · Your state may offer an online registry. This is a place where you can store your living will online so the doctors and nurses who need to treat you can find it right away. What should you think about when creating a living will? Talk about your end-of-life wishes with your family members and your doctor. Let them know what you want. That way the people making decisions for you won't be surprised by your choices. Think about these questions as you make your living will: · Do you know enough about life support methods that might be used? If not, talk to your doctor so you know what might be done if you can't breathe on your own, your heart stops, or you're unable to swallow.  
· What things would you still want to be able to do after you receive life-support methods? Would you want to be able to walk? To speak? To eat on your own? To live without the help of machines? · If you have a choice, where do you want to be cared for? In your home? At a hospital or nursing home? · Do you want certain Advent practices performed if you become very ill? · If you have a choice at the end of your life, where would you prefer to die? At home? In a hospital or nursing home? Somewhere else? · Would you prefer to be buried or cremated? · Do you want your organs to be donated after you die? What should you do with your living will? · Make sure that your family members and your health care agent have copies of your living will. · Give your doctor a copy of your living will to keep in your medical record. If you have more than one doctor, make sure that each one has a copy. · You may want to put a copy of your living will where it can be easily found. Where can you learn more? Go to http://zia-jono.info/. Enter G822 in the search box to learn more about \"Learning About Living Charu. \" Current as of: February 24, 2016 Content Version: 11.2 © 1376-7540 [employer]. Care instructions adapted under license by [employer] (which disclaims liability or warranty for this information). If you have questions about a medical condition or this instruction, always ask your healthcare professional. [place] [address] any warranty or liability for your use of this information. Advance Directives: Care Instructions Your Care Instructions An advance directive is a legal way to state your wishes at the end of your life. It tells your family and your doctor what to do if you can no longer say what you want. There are two main types of advance directives. You can change them any time that your wishes change.  
· A living will tells your family and your doctor your wishes about life support and other treatment. · A durable power of  for health care lets you name a person to make treatment decisions for you when you can't speak for yourself. This person is called a health care agent. If you do not have an advance directive, decisions about your medical care may be made by a doctor or a  who doesn't know you. It may help to think of an advance directive as a gift to the people who care for you. If you have one, they won't have to make tough decisions by themselves. Follow-up care is a key part of your treatment and safety. Be sure to make and go to all appointments, and call your doctor if you are having problems. It's also a good idea to know your test results and keep a list of the medicines you take. How can you care for yourself at home? · Discuss your wishes with your loved ones and your doctor. This way, there are no surprises. · Many states have a unique form. Or you might use a universal form that has been approved by many states. This kind of form can sometimes be completed and stored online. Your electronic copy will then be available wherever you have a connection to the Internet. In most cases, doctors will respect your wishes even if you have a form from a different state. · You don't need a  to do an advance directive. But you may want to get legal advice. · Think about these questions when you prepare an advance directive: ¨ Who do you want to make decisions about your medical care if you are not able to? Many people choose a family member or close friend. ¨ Do you know enough about life support methods that might be used? If not, talk to your doctor so you understand. ¨ What are you most afraid of that might happen? You might be afraid of having pain, losing your independence, or being kept alive by machines. ¨ Where would you prefer to die? Choices include your home, a hospital, or a nursing home. ¨ Would you like to have information about hospice care to support you and your family? ¨ Do you want to donate organs when you die? ¨ Do you want certain Orthodox practices performed before you die? If so, put your wishes in the advance directive. · Read your advance directive every year, and make changes as needed. When should you call for help? Be sure to contact your doctor if you have any questions. Where can you learn more? Go to http://zia-jono.info/. Enter R264 in the search box to learn more about \"Advance Directives: Care Instructions. \" Current as of: November 17, 2016 Content Version: 11.2 © 9248-2581 [employer]. Care instructions adapted under license by [employer] (which disclaims liability or warranty for this information). If you have questions about a medical condition or this instruction, always ask your healthcare professional. [address] any warranty or liability for your use of this information. Learning About Durable Power of  for Health Care What is a durable power of  for health care? A durable power of  for health care is one type of the legal forms called advance directives. It lets you decide who you want to make treatment decisions for you if you cannot speak or decide for yourself. The person you choose is called your health care agent. Another type of advance directive is a living will. It lets you write down what kinds of treatment or life support you want or do not want. What should you think about when choosing a health care agent? Choose your health care agent carefully. This person may or may not be a family member. Talk to the person before you make your final decision. Make sure he or she is comfortable with this responsibility. It's a good idea to choose someone who: · Is at least 25years old. · Knows you well and understands what makes life meaningful for you. · Understands your Anglican and moral values. · Will do what you want, not what he or she wants. · Will be able to make difficult choices at a stressful time. · Will be able to refuse or stop treatment, if that is what you would want, even if you could die. · Will be firm and confident with health professionals if needed. · Will ask questions to get necessary information. · Lives near you or agrees to travel to you if needed. Your family may help you make medical decisions while you can still be part of that process. But it is important to choose one person to be your health care agent in case you are not able to make decisions for yourself. If you don't fill out the legal form and name a health care agent, the decisions your family can make may be limited. Who will make decisions for you if you do not have a health care agent? If you don't have a health care agent or a living will, your family members may disagree about your medical care. And then some medical professionals who may not know you as well might have to make decisions for you. In some cases, a  makes the decisions. When you name a health care agent, it is very clear who has the power to make health decisions for you. How do you name a health care agent? You name your health care agent on a legal form. It is usually called a durable power of  for health care. Ask your hospital, state bar association, or office on aging where to find these forms. You must sign the form to make it legal. Some states require you to get the form notarized. This means that a person called a  watches you sign the form and then he or she signs the form. Some states also require that two or more witnesses sign the form. Be sure to tell your family members and doctors who your health care agent is. Keep your forms in a safe place. But make sure that your loved ones know where the forms are. This could be in your desk where you keep other important papers. Make sure your doctor has a copy of your forms. Where can you learn more? Go to http://zia-jono.info/. Enter [id number] in the search box to learn more about \"Learning About Durable Power of  for [facility]. \" Current as of: November 17, 2016 Content Version: 11.2 © 5112-2577 [employer]. Care instructions adapted under license by [employer] (which disclaims liability or warranty for this information). If you have questions about a medical condition or this instruction, always ask your healthcare professional. [address] any warranty or liability for your use of this information. Preventing Falls: Care Instructions Your Care Instructions Getting around your home safely can be a challenge if you have injuries or health problems that make it easy for you to fall. Loose rugs and furniture in walkways are among the dangers for many older people who have problems walking or who have poor eyesight. People who have conditions such as arthritis, osteoporosis, or dementia also have to be careful not to fall. You can make your home safer with a few simple measures. Follow-up care is a key part of your treatment and safety. Be sure to make and go to all appointments, and call your doctor if you are having problems. It's also a good idea to know your test results and keep a list of the medicines you take. How can you care for yourself at home? Taking care of yourself · You may get dizzy if you do not drink enough water. To prevent dehydration, drink plenty of fluids, enough so that your urine is light yellow or clear like water. Choose water and other caffeine-free clear liquids.  If you have kidney, heart, or liver disease and have to limit fluids, talk with your doctor before you increase the amount of fluids you drink. · Exercise regularly to improve your strength, muscle tone, and balance. Walk if you can. Swimming may be a good choice if you cannot walk easily. · Have your vision and hearing checked each year or any time you notice a change. If you have trouble seeing and hearing, you might not be able to avoid objects and could lose your balance. · Know the side effects of the medicines you take. Ask your doctor or pharmacist whether the medicines you take can affect your balance. Sleeping pills or sedatives can affect your balance. · Limit the amount of alcohol you drink. Alcohol can impair your balance and other senses. · Ask your doctor whether calluses or corns on your feet need to be removed. If you wear loose-fitting shoes because of calluses or corns, you can lose your balance and fall. · Talk to your doctor if you have numbness in your feet. Preventing falls at home · Remove raised doorway thresholds, throw rugs, and clutter. Repair loose carpet or raised areas in the floor. · Move furniture and electrical cords to keep them out of walking paths. · Use nonskid floor wax, and wipe up spills right away, especially on ceramic tile floors. · If you use a walker or cane, put rubber tips on it. If you use crutches, clean the bottoms of them regularly with an abrasive pad, such as steel wool. · Keep your house well lit, especially [place], and outside walkways. Use night-lights in areas such as hallways and bathrooms. Add extra light switches or use remote switches (such as switches that go on or off when you clap your hands) to make it easier to turn lights on if you have to get up during the night. · Install sturdy handrails on stairways. · Move items in your cabinets so that the things you use a lot are on the lower shelves (about waist level). · Keep a cordless phone and a flashlight with new batteries by your bed. If possible, put a phone in each of the main rooms of your house, or carry a cell phone in case you fall and cannot reach a phone. Or, you can wear a device around your neck or wrist. You push a button that sends a signal for help. · Wear low-heeled shoes that fit well and give your feet good support. Use footwear with nonskid soles. Check the heels and soles of your shoes for wear. Repair or replace worn heels or soles. · Do not wear socks without shoes on wood floors. · Walk on the grass when the sidewalks are slippery. If you live in an area that gets snow and ice in the winter, sprinkle salt on slippery steps and sidewalks. Preventing falls in the bath · Install grab bars and nonskid mats inside and outside your shower or tub and near the toilet and sinks. · Use shower chairs and bath benches. · Use a hand-held shower head that will allow you to sit while showering. · Get into a tub or shower by putting the weaker leg in first. Get out of a tub or shower with your strong side first. 
· Repair loose toilet seats and consider installing a raised toilet seat to make getting on and off the toilet easier. · Keep your bathroom door unlocked while you are in the shower. Where can you learn more? Go to http://zia-jono.info/. Enter 0476 [id number] in the search box to learn more about \"Preventing Falls: Care Instructions. \" Current as of: August 4, 2016 Content Version: 11.2 © 4701-7733 [employer]. Care instructions adapted under license by [employer] (which disclaims liability or warranty for this information). If you have questions about a medical condition or this instruction, always ask your healthcare professional. [place] [address] any warranty or liability for your use of this information. Preventing Outdoor Falls: Care Instructions Your Care Instructions Worries about falls don't need to keep you indoors.  Outdoor activities like walking have big benefits for your health. You will need to watch your step and learn a few safety measures. If you are worried about having a fall outdoors, ask your doctor about exercises, classes, or physical therapy that may help. You can learn ways to gain strength, flexibility, and balance. Ask if it might help to use a cane or walker. You can make your time outdoors safer with a few simple measures. Follow-up care is a key part of your treatment and safety. Be sure to make and go to all appointments, and call your doctor if you are having problems. It's also a good idea to know your test results and keep a list of the medicines you take. How can you prevent falls outdoors? · Wear shoes with firm soles and low heels. If you have to walk on an icy surface, use grippers that can be worn over your shoes in bad weather. · Be extra careful if weather is bad. Walk on the grass when the sidewalks are slick. If you live in a place that gets snow and ice in the winter, sprinkle salt on slippery stairs and sidewalks. · Be careful getting on or off buses and trains or getting in and out of cars. If handrails are available, use them. · Be careful when you cross the street. Look for crosswalks or places where curb cuts or ramps are present. · Try not to hurry, especially if you are carrying something. · Be cautious in parking lots or garages. There may be curbs or changes in pavement, or the height of the pavement may vary. · Make sure to wear the correct eyeglasses, if you need them. Reading glasses or bifocals can make it harder to see hazards that might be in your way. · If you are walking outdoors for exercise, try to: 
¨ Walk in well-lighted, well-maintained areas. These include high school or college tracks, shopping malls, and public spaces. ¨ Walk with a partner.  
¨ Watch out for cracked sidewalks, curbs, changes in the height of the pavement, exposed tree roots, and debris such as fallen leaves or branches. Where can you learn more? Go to http://zia-jono.info/. Enter N586 in the search box to learn more about \"Preventing Outdoor Falls: Care Instructions. \" Current as of: August 4, 2016 Content Version: 11.2 © 1922-9083 [employer]. Care instructions adapted under license by [employer] (which disclaims liability or warranty for this information). If you have questions about a medical condition or this instruction, always ask your healthcare professional. [address] any warranty or liability for your use of this information. How to Get Up Safely After a Fall: Care Instructions Your Care Instructions If you have injuries, health problems, or other reasons that may make it easy for you to fall at home, it is a good idea to learn how to get up safely after a fall. Learning how to get up correctly can help you avoid making an injury worse. Also, knowing what to do if you cannot get up can help you stay safe until help arrives. Follow-up care is a key part of your treatment and safety. Be sure to make and go to all appointments, and call your doctor if you are having problems. It's also a good idea to know your test results and keep a list of the medicines you take. How can you care for yourself after a fall? If you think you can get up First lie still for a few minutes and think about how you feel. If your body feels okay and you think you can get up safely, follow the rest of the steps below: 1. Look for a chair or other piece of furniture that is close to you. 2. Roll onto your side and rest. Roll by turning your head in the direction you want to roll, move your shoulder and arm, then hip and leg in the same direction.  
3. Lie still for a moment to let your blood pressure adjust. 
4. Slowly push your upper body up, lift your head, and take a moment to rest. 
 5. Slowly get up on your hands and knees, and crawl to the chair or other stable piece of furniture. 6. Put your hands on the chair. 7. Move one foot forward, and place it flat on the floor. Your other leg should be bent with the knee on the floor. 8. Rise slowly, turn your body, and sit in the chair. Stay seated for a bit and think about how you feel. Call for help. Even if you feel okay, let someone know what happened to you. You might not know that you have a serious injury. If you cannot get up 1. If you think you are injured after a fall or you cannot get up, try not to panic. 2. Call out for help. 3. If you have a phone within reach or you have an emergency call device, use it to call for help. 4. If you do not have a phone within reach, try to slide yourself toward it. If you cannot get to the phone, try to slide toward a door or window or a place where you think you can be heard. 5. [place] or use an object to make noise so someone might hear you. 6. If you can reach something that you can use for a pillow, place it under your head. Try to stay warm by covering yourself with a blanket or clothing while you wait for help. When should you call for help? Call 911 anytime you think you may need emergency care. For example, call if: 
· You passed out (lost consciousness). · You cannot get up after a fall. · You believe you have serious or life-threatening injuries. Call your doctor now or seek immediate medical care if: 
· You have severe pain. · You think you may have passed out but are not sure. · You hit your head or think you may have hit your head but are not sure. · You think your medicines may have caused you to fall. Watch closely for changes in your health, and be sure to contact your doctor if: 
· You have fallen, even if you think you are not hurt. Do not feel embarrassed to let your doctor know you have fallen.  Your doctor may be able to adjust your medicines or provide other help so you can prevent future falls. Where can you learn more? Go to http://zia-jono.info/. Enter W759 in the search box to learn more about \"How to Get Up Safely After a Fall: Care Instructions. \" Current as of: August 4, 2016 Content Version: 11.2 © 4088-3413 [employer]. Care instructions adapted under license by [employer] (which disclaims liability or warranty for this information). If you have questions about a medical condition or this instruction, always ask your healthcare professional. [address] any warranty or liability for your use of this information. Introducing [facility]! [employer] introduces [employer] patient portal. Now you can access parts of your medical record, email your doctor's office, and request medication refills online. 1. In your internet browser, go to [email or web address] 2. Click on the First Time User? Click Here link in the Sign In box. You will see the New Member Sign Up page. 3. Enter your Serus Access Code exactly as it appears below. You will not need to use this code after youve completed the sign-up process. If you do not sign up before the expiration date, you must request a new code. · Serus Access Code: [email or web address] Expires: 7/6/2017 10:12 AM 
 
4. Enter the last four digits of your Social Security Number (xxxx) and Date of Birth (mm/dd/yyyy) as indicated and click Submit. You will be taken to the next sign-up page. 5. Create a Frequencyt ID. This will be your Serus login ID and cannot be changed, so think of one that is secure and easy to remember. 6. Create a Serus password. You can change your password at any time. 7. Enter your Password Reset Question and Answer. This can be used at a later time if you forget your password. 8. Enter your e-mail address. You will receive e-mail notification when new information is available in [address]. 9. Click Sign Up. You can now view and download portions of your medical record. 10. Click the Download Summary menu link to download a portable copy of your medical information. If you have questions, please visit the Frequently Asked Questions section of the [employer] website. Remember, [employer] is NOT to be used for urgent needs. For medical emergencies, dial 911. Now available from your iPhone and Android! Please provide this summary of care documentation to your next provider. Your primary care clinician is listed as [address] [place] [address]. If you have any questions after today's visit, please call [phone].

## 2017-04-07 NOTE — PATIENT INSTRUCTIONS
Schedule of Personalized Health Plan for Beba Sanchez  (Provide Copy to Patient)   04/07/17      The best way to stay healthy is to live a healthy lifestyle. A healthy lifestyle includes regular exercise, eating a well-balanced diet, keeping a healthy weight and not smoking. Regular physical exams and screening tests are another important way to take care of yourself. Preventive exams provided by health care providers can find health problems early when treatment works best and can keep you from getting certain diseases or illnesses. Preventive services include exams, lab tests, screenings, shots, monitoring and information to help you take care of your own health. All people over 65 should have a pneumonia shot. Pneumonia shots are usually only needed once in a lifetime unless your doctor decides differently. All people over 65 should have a yearly flu shot. People over 65 are at medium to high risk for Hepatitis B. Three shots are needed for complete protection. In addition to your physical exam, some screening tests are recommended:    Bone mass measurement (dexa scan) is recommended every two years  Diabetes Mellitus screening is recommended every year. Glaucoma is an eye disease caused by high pressure in the eye. An eye exam is recommended every year. Cardiovascular screening tests that check your cholesterol and other blood fat (lipid) levels are recommended every five years. Colorectal Cancer screening tests help to find pre-cancerous polyps (growths in the colon) so they can be removed before they turn into cancer. Tests ordered for screening depend on your personal and family history risk factors.     Screening for Breast Cancer is recommended yearly with a mammogram.    Screening for Cervical Cancer is recommended every two years (annually for certain risk factors, such as previous history of STD or abnormal PAP in past 7 years), with a Pelvic Exam with PAP    Here is a list of your current Health Maintenance items with a due date:  Health Maintenance   Topic Date Due    DTaP/Tdap/Td series (1 - Tdap) 01/13/1966    MEDICARE YEARLY EXAM  03/04/2017    FOBT Q 1 YEAR AGE 50-75  07/14/2017    BREAST CANCER SCRN MAMMOGRAM  03/10/2018    Pneumococcal 65+ Low/Medium Risk (2 of 2 - PPSV23) 04/07/2018    GLAUCOMA SCREENING Q2Y  04/07/2019    Hepatitis C Screening  Completed    OSTEOPOROSIS SCREENING (DEXA)  Completed    ZOSTER VACCINE AGE 60>  Addressed    INFLUENZA AGE 9 TO ADULT  Addressed              Advance Care Planning: Care Instructions  Your Care Instructions  It can be hard to live with an illness that cannot be cured. But if your health is getting worse, you may want to make decisions about end-of-life care. Planning for the end of your life does not mean that you are giving up. It is a way to make sure that your wishes are met. Clearly stating your wishes can make it easier for your loved ones. Making plans while you are still able may also ease your mind and make your final days less stressful and more meaningful. Follow-up care is a key part of your treatment and safety. Be sure to make and go to all appointments, and call your doctor if you are having problems. It's also a good idea to know your test results and keep a list of the medicines you take. What can you do to plan for the end of life? · You can bring these issues up with your doctor. You do not need to wait until your doctor starts the conversation. You might start with \"I would not be willing to live with . Zay Basket Zay Basket Zay Basket \" When you complete this sentence it helps your doctor understand your wishes. · Talk openly and honestly with your doctor. This is the best way to understand the decisions you will need to make as your health changes. Know that you can always change your mind. · Ask your doctor about commonly used life-support measures.  These include tube feedings, breathing machines, and fluids given through a vein (IV). Understanding these treatments will help you decide whether you want them. · You may choose to have these life-supporting treatments for a limited time. This allows a trial period to see whether they will help you. You may also decide that you want your doctor to take only certain measures to keep you alive. It is important to spell out these conditions so that your doctor and family understand them. · Talk to your doctor about how long you are likely to live. He or she may be able to give you an idea of what usually happens with your specific illness. · Think about preparing papers that state your wishes. This way there will not be any confusion about what you want. You can change your instructions at any time. Which papers should you prepare? Advance directives are legal papers that tell doctors how you want to be cared for at the end of your life. You do not need a  to write these papers. Ask your doctor or your [facility] department for information on how to write your advance directives. They may have the forms for each of these types of papers. Make sure your doctor has a copy of these on file, and give a copy to a family member or close friend. · Consider a do-not-resuscitate order (DNR). This order asks that no extra treatments be done if your heart stops or you stop breathing. Extra treatments may include cardiopulmonary resuscitation (CPR), electrical shock to restart your heart, or a machine to breathe for you. If you decide to have a DNR order, ask your doctor to explain and write it. Place the order in your home where everyone can easily see it. · Consider a living will. A living will explains your wishes about life support and other treatments at the end of your life if you become unable to speak for yourself. Living cort[place] tell doctors to use or not use treatments that would keep you alive.  You must have one or two witnesses or a notary present when you sign this form.  · Consider a durable power of  for health care. This allows you to name a person to make decisions about your care if you are not able to. Most people ask a close friend or family member. Talk to this person about the kinds of treatments you want and those that you do not want. Make sure this person understands your wishes. These legal papers are simple to change. Tell your doctor what you want to change, and ask him or her to make a note in your medical file. Give your family updated copies of the papers. Where can you learn more? Go to http://zia-jono.info/. Enter P184 in the search box to learn more about \"Advance Care Planning: Care Instructions. \"  Current as of: November 17, 2016  Content Version: 11.2  © 5131-3993 [employer]. Care instructions adapted under license by [employer] (which disclaims liability or warranty for this information). If you have questions about a medical condition or this instruction, always ask your healthcare professional. [address] any warranty or liability for your use of this information. Deciding About Life-Prolonging Treatment  What is life-prolonging treatment? There are many kinds of treatment that can help you live longer. These may be needed for only a short time until your illness improves. Or you may use them over the long term to help keep you alive. Some treatments include the use of:  · Medicines to slow the progress of certain diseases, such as heart disease, diabetes, cancer, AIDS, or Alzheimer's disease. · Antibiotics to treat serious infections, such as pneumonia. · Dialysis to clean your blood if your kidneys stop working. · A breathing machine to help you breathe if you can't breathe on your own. This machine pumps air into your lungs through a tube put into your throat.   · A feeding tube or an intravenous (IV) line to give you food and fluids if you can't eat or drink.  · Cardiopulmonary resuscitation (CPR) to try to restart your heart. The decision to receive treatments that may help you live longer is a personal one. You may want your doctor to do everything possible to keep you alive, even when your chance for recovery is small. Or you may choose to only have care to manage your pain and other symptoms. What are key points about this decision? · If there is a good chance that your illness can be cured or managed, your doctor may advise you to first try available treatments. If these don't work, then you might think about stopping treatment. · If you stop treatment, you will still receive care that focuses on pain relief and comfort. · A decision to stop treatment that keeps you alive does not have to be permanent. You can always change your mind if your health starts to improve. · Even though treatment focuses on helping you live longer, it may cause side effects that can greatly affect your quality of life. And it could affect how you spend time with your family and friends. · If you still have personal goals that you want to pursue, you may want treatment that keeps you alive long enough to reach them. Why might you choose life-prolonging treatment? · There is a good chance that your illness can be cured or managed. · You think you can manage the possible side effects of treatment. · You don't think treatment will get in the way of your quality of life. · You have personal goals that you still want to pursue and achieve. Why might you choose to stop life-prolonging treatment? · Your chance of surviving your illness is very low. · You have tried all possible treatments for your illness, but they have not helped. · You can no longer deal with the side effects of treatment. · You have already met the goals you set out to achieve in your life. Your decision  Thinking about the facts and your feelings can help you make a decision that is right for you.  Be sure you understand the benefits and risks of your options, and think about what else you need to do before you make the decision. Where can you learn more? Go to http://zia-jono.info/. Enter P495 in the search box to learn more about \"Deciding About Life-Prolonging Treatment. \"  Current as of: February 24, 2016  Content Version: 11.2  © 2514-5845 [employer]. Care instructions adapted under license by [employer] (which disclaims liability or warranty for this information). If you have questions about a medical condition or this instruction, always ask your healthcare professional. [place] [address] any warranty or liability for your use of this information. Learning About Living [place]  What is a living will? A living will is a legal form you use to write down the kind of care you want at the end of your life. It is used by the health professionals who will treat you if you aren't able to decide for yourself. If you put your wishes in writing, your loved ones and others will know what kind of care you want. They won't need to guess. This can ease your mind and be helpful to others. A living will is not the same as an estate or property will. An estate will explains what you want to happen with your money and property after you die. Is a living will a legal document? A living will is a legal document. Each state has its own laws about living [place]. If you move to another state, make sure that your living will is legal in the state where you now live. Or you might use a universal form that has been approved by many states. This kind of form can sometimes be completed and stored online. Your electronic copy will then be available wherever you have a connection to the Internet. In most cases, doctors will respect your wishes even if you have a form from a different state. · You don't need an  to complete a living will.  But legal advice can be helpful if your state's laws are unclear, your health history is complicated, or your family can't agree on what should be in your living will. · You can change your living will at any time. Some people find that their wishes about end-of-life care change as their health changes. · In addition to making a living will, think about completing a medical power of  form. This form lets you name the person you want to make end-of-life treatment decisions for you (your \"health care agent\") if you're not able to. Many hospitals and nursing homes will give you the forms you need to complete a living will and a medical power of . · Your living will is used only if you can't make or communicate decisions for yourself anymore. If you become able to make decisions again, you can accept or refuse any treatment, no matter what you wrote in your living will. · Your state may offer an online registry. This is a place where you can store your living will online so the doctors and nurses who need to treat you can find it right away. What should you think about when creating a living will? Talk about your end-of-life wishes with your family members and your doctor. Let them know what you want. That way the people making decisions for you won't be surprised by your choices. Think about these questions as you make your living will:  · Do you know enough about life support methods that might be used? If not, talk to your doctor so you know what might be done if you can't breathe on your own, your heart stops, or you're unable to swallow. · What things would you still want to be able to do after you receive life-support methods? Would you want to be able to walk? To speak? To eat on your own? To live without the help of machines? · If you have a choice, where do you want to be cared for? In your home? At a hospital or nursing home?   · Do you want certain Jehovah's witness practices performed if you become very ill? · If you have a choice at the end of your life, where would you prefer to die? At home? In a hospital or nursing home? Somewhere else? · Would you prefer to be buried or cremated? · Do you want your organs to be donated after you die? What should you do with your living will? · Make sure that your family members and your health care agent have copies of your living will. · Give your doctor a copy of your living will to keep in your medical record. If you have more than one doctor, make sure that each one has a copy. · You may want to put a copy of your living will where it can be easily found. Where can you learn more? Go to http://zia-jono.info/. Enter Q985 in the search box to learn more about \"Learning About Living [place]. \"  Current as of: February 24, 2016  Content Version: 11.2  © 8450-5957 [employer]. Care instructions adapted under license by [employer] (which disclaims liability or warranty for this information). If you have questions about a medical condition or this instruction, always ask your healthcare professional. [place] [address] any warranty or liability for your use of this information. Advance Directives: Care Instructions  Your Care Instructions  An advance directive is a legal way to state your wishes at the end of your life. It tells your family and your doctor what to do if you can no longer say what you want. There are two main types of advance directives. You can change them any time that your wishes change. · A living will tells your family and your doctor your wishes about life support and other treatment. · A durable power of  for health care lets you name a person to make treatment decisions for you when you can't speak for yourself. This person is called a health care agent.   If you do not have an advance directive, decisions about your medical care may be made by a doctor or a  who doesn't know you. It may help to think of an advance directive as a gift to the people who care for you. If you have one, they won't have to make tough decisions by themselves. Follow-up care is a key part of your treatment and safety. Be sure to make and go to all appointments, and call your doctor if you are having problems. It's also a good idea to know your test results and keep a list of the medicines you take. How can you care for yourself at home? · Discuss your wishes with your loved ones and your doctor. This way, there are no surprises. · Many states have a unique form. Or you might use a universal form that has been approved by many states. This kind of form can sometimes be completed and stored online. Your electronic copy will then be available wherever you have a connection to the Internet. In most cases, doctors will respect your wishes even if you have a form from a different state. · You don't need a  to do an advance directive. But you may want to get legal advice. · Think about these questions when you prepare an advance directive:  ¨ Who do you want to make decisions about your medical care if you are not able to? Many people choose a family member or close friend. ¨ Do you know enough about life support methods that might be used? If not, talk to your doctor so you understand. ¨ What are you most afraid of that might happen? You might be afraid of having pain, losing your independence, or being kept alive by machines. ¨ Where would you prefer to die? Choices include your home, a hospital, or a nursing home. ¨ Would you like to have information about hospice care to support you and your family? ¨ Do you want to donate organs when you die? ¨ Do you want certain Roman Catholic practices performed before you die? If so, put your wishes in the advance directive. · Read your advance directive every year, and make changes as needed. When should you call for help?   Be sure to contact your doctor if you have any questions. Where can you learn more? Go to http://zia-jono.info/. Enter R264 in the search box to learn more about \"Advance Directives: Care Instructions. \"  Current as of: November 17, 2016  Content Version: 11.2  © 0533-1801 [employer]. Care instructions adapted under license by [employer] (which disclaims liability or warranty for this information). If you have questions about a medical condition or this instruction, always ask your healthcare professional. [address] any warranty or liability for your use of this information. Learning About Durable Power of  for Health Care  What is a durable power of  for health care? A durable power of  for health care is one type of the legal forms called advance directives. It lets you decide who you want to make treatment decisions for you if you cannot speak or decide for yourself. The person you choose is called your health care agent. Another type of advance directive is a living will. It lets you write down what kinds of treatment or life support you want or do not want. What should you think about when choosing a health care agent? Choose your health care agent carefully. This person may or may not be a family member. Talk to the person before you make your final decision. Make sure he or she is comfortable with this responsibility. It's a good idea to choose someone who:  · Is at least 25years old. · Knows you well and understands what makes life meaningful for you. · Understands your Orthodox and moral values. · Will do what you want, not what he or she wants. · Will be able to make difficult choices at a stressful time. · Will be able to refuse or stop treatment, if that is what you would want, even if you could die. · Will be firm and confident with health professionals if needed.   · Will ask questions to get necessary information. · Lives near you or agrees to travel to you if needed. Your family may help you make medical decisions while you can still be part of that process. But it is important to choose one person to be your health care agent in case you are not able to make decisions for yourself. If you don't fill out the legal form and name a health care agent, the decisions your family can make may be limited. Who will make decisions for you if you do not have a health care agent? If you don't have a health care agent or a living will, your family members may disagree about your medical care. And then some medical professionals who may not know you as well might have to make decisions for you. In some cases, a  makes the decisions. When you name a health care agent, it is very clear who has the power to make health decisions for you. How do you name a health care agent? You name your health care agent on a legal form. It is usually called a durable power of  for health care. Ask your hospital, state bar association, or office on aging where to find these forms. You must sign the form to make it legal. Some states require you to get the form notarized. This means that a person called a  watches you sign the form and then he or she signs the form. Some states also require that two or more witnesses sign the form. Be sure to tell your family members and doctors who your health care agent is. Keep your forms in a safe place. But make sure that your loved ones know where the forms are. This could be in your desk where you keep other important papers. Make sure your doctor has a copy of your forms. Where can you learn more? Go to http://zia-jono.info/. Enter [id number] in the search box to learn more about \"Learning About Durable Power of  for [facility]. \"  Current as of: November 17, 2016  Content Version: 11.2  © 4047-6839 [employer], [facility].  Care instructions adapted under license by [employer] (which disclaims liability or warranty for this information). If you have questions about a medical condition or this instruction, always ask your healthcare professional. [address] any warranty or liability for your use of this information. Preventing Falls: Care Instructions  Your Care Instructions  Getting around your home safely can be a challenge if you have injuries or health problems that make it easy for you to fall. Loose rugs and furniture in walkways are among the dangers for many older people who have problems walking or who have poor eyesight. People who have conditions such as arthritis, osteoporosis, or dementia also have to be careful not to fall. You can make your home safer with a few simple measures. Follow-up care is a key part of your treatment and safety. Be sure to make and go to all appointments, and call your doctor if you are having problems. It's also a good idea to know your test results and keep a list of the medicines you take. How can you care for yourself at home? Taking care of yourself  · You may get dizzy if you do not drink enough water. To prevent dehydration, drink plenty of fluids, enough so that your urine is light yellow or clear like water. Choose water and other caffeine-free clear liquids. If you have kidney, heart, or liver disease and have to limit fluids, talk with your doctor before you increase the amount of fluids you drink. · Exercise regularly to improve your strength, muscle tone, and balance. Walk if you can. Swimming may be a good choice if you cannot walk easily. · Have your vision and hearing checked each year or any time you notice a change. If you have trouble seeing and hearing, you might not be able to avoid objects and could lose your balance. · Know the side effects of the medicines you take.  Ask your doctor or pharmacist whether the medicines you take can affect your balance. Sleeping pills or sedatives can affect your balance. · Limit the amount of alcohol you drink. Alcohol can impair your balance and other senses. · Ask your doctor whether calluses or corns on your feet need to be removed. If you wear loose-fitting shoes because of calluses or corns, you can lose your balance and fall. · Talk to your doctor if you have numbness in your feet. Preventing falls at home  · Remove raised doorway thresholds, throw rugs, and clutter. Repair loose carpet or raised areas in the floor. · Move furniture and electrical cords to keep them out of walking paths. · Use nonskid floor wax, and wipe up spills right away, especially on ceramic tile floors. · If you use a walker or cane, put rubber tips on it. If you use crutches, clean the bottoms of them regularly with an abrasive pad, such as steel wool. · Keep your house well lit, especially Fredie Cumins, and outside walkways. Use night-lights in areas such as hallways and bathrooms. Add extra light switches or use remote switches (such as switches that go on or off when you clap your hands) to make it easier to turn lights on if you have to get up during the night. · Install sturdy handrails on stairways. · Move items in your cabinets so that the things you use a lot are on the lower shelves (about waist level). · Keep a cordless phone and a flashlight with new batteries by your bed. If possible, put a phone in each of the main rooms of your house, or carry a cell phone in case you fall and cannot reach a phone. Or, you can wear a device around your neck or wrist. You push a button that sends a signal for help. · Wear low-heeled shoes that fit well and give your feet good support. Use footwear with nonskid soles. Check the heels and soles of your shoes for wear. Repair or replace worn heels or soles. · Do not wear socks without shoes on wood floors. · Walk on the grass when the sidewalks are slippery.  If you live in an area that gets snow and ice in the winter, sprinkle salt on slippery steps and sidewalks. Preventing falls in the bath  · Install grab bars and nonskid mats inside and outside your shower or tub and near the toilet and sinks. · Use shower chairs and bath benches. · Use a hand-held shower head that will allow you to sit while showering. · Get into a tub or shower by putting the weaker leg in first. Get out of a tub or shower with your strong side first.  · Repair loose toilet seats and consider installing a raised toilet seat to make getting on and off the toilet easier. · Keep your bathroom door unlocked while you are in the shower. Where can you learn more? Go to http://zia-jono.info/. Enter 0476 [id number] in the search box to learn more about \"Preventing Falls: Care Instructions. \"  Current as of: August 4, 2016  Content Version: 11.2  © 0028-8581 [email or web address]. Care instructions adapted under license by [employer] (which disclaims liability or warranty for this information). If you have questions about a medical condition or this instruction, always ask your healthcare professional. [place] [address] any warranty or liability for your use of this information. Preventing Outdoor Falls: Care Instructions  Your Care Instructions  Worries about falls don't need to keep you indoors. Outdoor activities like walking have big benefits for your health. You will need to watch your step and learn a few safety measures. If you are worried about having a fall outdoors, ask your doctor about exercises, classes, or physical therapy that may help. You can learn ways to gain strength, flexibility, and balance. Ask if it might help to use a cane or walker. You can make your time outdoors safer with a few simple measures. Follow-up care is a key part of your treatment and safety.  Be sure to make and go to all appointments, and call your doctor if you are having problems. It's also a good idea to know your test results and keep a list of the medicines you take. How can you prevent falls outdoors? · Wear shoes with firm soles and low heels. If you have to walk on an icy surface, use grippers that can be worn over your shoes in bad weather. · Be extra careful if weather is bad. Walk on the grass when the sidewalks are slick. If you live in a place that gets snow and ice in the winter, sprinkle salt on slippery stairs and sidewalks. · Be careful getting on or off buses and trains or getting in and out of cars. If handrails are available, use them. · Be careful when you cross the street. Look for crosswalks or places where curb cuts or ramps are present. · Try not to hurry, especially if you are carrying something. · Be cautious in parking lots or garages. There may be curbs or changes in pavement, or the height of the pavement may vary. · Make sure to wear the correct eyeglasses, if you need them. Reading glasses or bifocals can make it harder to see hazards that might be in your way. · If you are walking outdoors for exercise, try to:  ¨ Walk in well-lighted, well-maintained areas. These include high school or college tracks, shopping malls, and public spaces. ¨ Walk with a partner. ¨ Watch out for cracked sidewalks, curbs, changes in the height of the pavement, exposed tree roots, and debris such as fallen leaves or branches. Where can you learn more? Go to http://zia-jono.info/. Enter F202 in the search box to learn more about \"Preventing Outdoor Falls: Care Instructions. \"  Current as of: August 4, 2016  Content Version: 11.2  © 6779-7304 [employer]. Care instructions adapted under license by [employer] (which disclaims liability or warranty for this information).  If you have questions about a medical condition or this instruction, always ask your healthcare professional. Alleen Fleischer disclaims any warranty or liability for your use of this information. How to Get Up Safely After a Fall: Care Instructions  Your Care Instructions  If you have injuries, health problems, or other reasons that may make it easy for you to fall at home, it is a good idea to learn how to get up safely after a fall. Learning how to get up correctly can help you avoid making an injury worse. Also, knowing what to do if you cannot get up can help you stay safe until help arrives. Follow-up care is a key part of your treatment and safety. Be sure to make and go to all appointments, and call your doctor if you are having problems. It's also a good idea to know your test results and keep a list of the medicines you take. How can you care for yourself after a fall? If you think you can get up  First lie still for a few minutes and think about how you feel. If your body feels okay and you think you can get up safely, follow the rest of the steps below:  1. Look for a chair or other piece of furniture that is close to you. 2. Roll onto your side and rest. Roll by turning your head in the direction you want to roll, move your shoulder and arm, then hip and leg in the same direction. 3. Lie still for a moment to let your blood pressure adjust.  4. Slowly push your upper body up, lift your head, and take a moment to rest.  5. Slowly get up on your hands and knees, and crawl to the chair or other stable piece of furniture. 6. Put your hands on the chair. 7. Move one foot forward, and place it flat on the floor. Your other leg should be bent with the knee on the floor. 8. Rise slowly, turn your body, and sit in the chair. Stay seated for a bit and think about how you feel. Call for help. Even if you feel okay, let someone know what happened to you. You might not know that you have a serious injury. If you cannot get up  1. If you think you are injured after a fall or you cannot get up, try not to panic.   2. Call out for help. 3. If you have a phone within reach or you have an emergency call device, use it to call for help. 4. If you do not have a phone within reach, try to slide yourself toward it. If you cannot get to the phone, try to slide toward a door or window or a place where you think you can be heard. 5. [place] or use an object to make noise so someone might hear you. 6. If you can reach something that you can use for a pillow, place it under your head. Try to stay warm by covering yourself with a blanket or clothing while you wait for help. When should you call for help? Call 911 anytime you think you may need emergency care. For example, call if:  · You passed out (lost consciousness). · You cannot get up after a fall. · You believe you have serious or life-threatening injuries. Call your doctor now or seek immediate medical care if:  · You have severe pain. · You think you may have passed out but are not sure. · You hit your head or think you may have hit your head but are not sure. · You think your medicines may have caused you to fall. Watch closely for changes in your health, and be sure to contact your doctor if:  · You have fallen, even if you think you are not hurt. Do not feel embarrassed to let your doctor know you have fallen. Your doctor may be able to adjust your medicines or provide other help so you can prevent future falls. Where can you learn more? Go to http://zia-jono.info/. Enter Y004 in the search box to learn more about \"How to Get Up Safely After a Fall: Care Instructions. \"  Current as of: August 4, 2016  Content Version: 11.2  © 0807-6179 [employer]. Care instructions adapted under license by [employer] (which disclaims liability or warranty for this information).  If you have questions about a medical condition or this instruction, always ask your healthcare professional. Jerry Encarnacion disclaims any warranty or liability for your use of this information.

## 2017-04-07 NOTE — PROGRESS NOTES
Kwabena Bolaños is a 71 y.o. female and presents for annual Medicare Wellness Visit. Problem List: Reviewed with patient and discussed risk factors. 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 No pertinent surgical history. SH: Reviewed with patient  Social History   Substance Use Topics    Smoking status: Former Smoker     Packs/day: 0.50     Years: 20.00     Quit date: 6/4/2015    Smokeless tobacco: Never Used    Alcohol use No       FH: Reviewed with patient  Family History   Problem Relation Age of Onset    Tremors Mother     Cancer Mother     No Known Problems Father     Emphysema Maternal Aunt     No Known Problems Maternal Uncle     No Known Problems Maternal Grandmother     Tremors Maternal Grandfather     Hypertension Son     Hypertension Son        Medications/Allergies: Reviewed with patient  Current Outpatient Prescriptions on File Prior to Visit   Medication Sig Dispense Refill    lactulose (GENERLAC) 10 gram/15 mL solution Take  by mouth three (3) times daily.  pravastatin (PRAVACHOL) 20 mg tablet Take 1 Tab by mouth nightly. 30 Tab 2     No current facility-administered medications on file prior to visit.        No Known Allergies    Objective:  Visit Vitals    /78 (BP 1 Location: Left arm, BP Patient Position: Sitting)    Pulse 76    Temp 97.8 °F (36.6 °C) (Oral)    Resp 15    Ht 5' (1.524 m)    Wt 106 lb (48.1 kg)    SpO2 97%    BMI 20.7 kg/m2    Body mass index is 20.7 kg/(m^2). Assessment of cognitive impairment:   Alert and oriented x 3  Oriented to situation? yes  Memory Problem? no  Cognition Problem? no    Depression Screen:   PHQ 2 / 9, over the last two weeks 4/7/2017   Little interest or pleasure in doing things Not at all   Feeling down, depressed or hopeless Not at all   Total Score PHQ 2 0       Abuse Screen:   Abuse Screening Questionnaire 4/7/2017   Do you ever feel afraid of your partner? N   Are you in a relationship with someone who physically or mentally threatens you? N   Is it safe for you to go home? Y       Fall Risk Assessment:    Fall Risk Assessment, last 12 mths 4/7/2017   Able to walk? Yes   Fall in past 12 months? No   Fall with injury? -   Number of falls in past 12 months -   Fall Risk Score -       Functional Ability:   Does the patient exhibit a steady gait? yes   How long did it take the patient to get up and walk from a sitting position? 1 sec   Is the patient self reliant?  (ie can do own laundry, meals, household chores)  yes     Does the patient handle his/her own medications? yes     Does the patient handle his/her own money? yes     Is the patients home safe (ie good lighting, handrails on stairs and bath, etc.)? yes     Did you notice or did patient express any hearing difficulties? no     Did you notice of did patient express any vision difficulties?   no     Were distance and reading eye charts used? yes       Advance Care Planning:   Patient was offered the opportunity to discuss advance care planning:  yes     Does patient have an Advance Directive:  no   If no, did you provide information on Caring Connections?   yes       Plan:      Orders Placed This Encounter    LIPID PANEL    ALT    AST       Health Maintenance   Topic Date Due    DTaP/Tdap/Td series (1 - Tdap) 01/13/1966    MEDICARE YEARLY EXAM  03/04/2017    FOBT Q 1 YEAR AGE 50-75  07/14/2017    BREAST CANCER SCRN MAMMOGRAM  03/10/2018    Pneumococcal 65+ Low/Medium Risk (2 of 2 - PPSV23) 04/07/2018    GLAUCOMA SCREENING Q2Y  04/07/2019    Hepatitis C Screening  Completed    OSTEOPOROSIS SCREENING (DEXA)  Completed    ZOSTER VACCINE AGE 60>  Addressed    INFLUENZA AGE 9 TO ADULT  Addressed     She says that she is UTD on Tdap. Advised to bring record. *Patient verbalized understanding and agreement with the plan. A copy of the After Visit Summary with personalized health plan was given to the patient today.

## 2017-04-07 NOTE — PROGRESS NOTES
Severiano January is a 71 y.o.  female presents today for offcie visit for follow up. Pt is in Room # 4      1. Have you been to the ER, urgent care clinic since your last visit? Hospitalized since your last visit? No    2. Have you seen or consulted any other health care providers outside of the [address] [place] [address] since your last visit? Include any pap smears or colon screening. Yes Urology    No Patient Care Coordination Note on file.      Visual Acuity Screening    Right eye Left eye Both eyes   Without correction: 20/25 20/25 20/20   With correction:

## 2017-04-07 NOTE — PROGRESS NOTES
SUBJECTIVE:  Malini Medina is a 71y.o. year old female   Chief Complaint   Patient presents with    Cholesterol Problem    Other     Post herpetic neuralgia    Annual Wellness Visit       History of Present Illness: The singles rash has resolved. The pain the zoster area is improved. She says PRN Tylenol is enough. She is now following diet for lipids. She is taking pravastatin w/o problem. She is following up with urology for renal calculi. The patient has been encouraged to follow up with GI. Past Medical History:   Diagnosis Date    Calculus of kidney     H/O bronchitis     Shingles      History reviewed. No pertinent surgical history. Current Outpatient Prescriptions   Medication Sig    lactulose (GENERLAC) 10 gram/15 mL solution Take  by mouth three (3) times daily.  pravastatin (PRAVACHOL) 20 mg tablet Take 1 Tab by mouth nightly. No current facility-administered medications for this visit. No Known Allergies     Family History   Problem Relation Age of Onset    Tremors Mother    Darci Eans Cancer Mother     No Known Problems Father     Emphysema Maternal Aunt     No Known Problems Maternal Uncle     No Known Problems Maternal Grandmother     Tremors Maternal Grandfather     Hypertension Son     Hypertension Son         Social History   Substance Use Topics    Smoking status: Former Smoker     Packs/day: 0.50     Years: 20.00     Quit date: 6/4/2015    Smokeless tobacco: Never Used    Alcohol use No       Review of Systems:   Constitutional: No fever, chills, night sweats, malaise, dizziness. Cardiovascular: No angina, palpitations, PND, orthopnea, lightheadedness, edema, claudication. Respiratory: No pleurisy, hemoptysis, unusual cough or sputum. Gastrointestinal: No nausea/ vomiting, KYE symptoms, melena, hematochezia. Psychiatric: No agitation, confusion/disorientation, suicidal or homicidal ideation.   Skin: otherwise no complaints. OBJECTIVE:  Physical Exam:   Constitutional: General Appearance:  well developed, well nourished, nontoxic, in no acute distress. Visit Vitals    /78 (BP 1 Location: Left arm, BP Patient Position: Sitting)    Pulse 76    Temp 97.8 °F (36.6 °C) (Oral)    Resp 15    Ht 5' (1.524 m)    Wt 106 lb (48.1 kg)    SpO2 97%    BMI 20.7 kg/m2     Pulmonary: Respiratory effort: normal; no dyspnea, no retractions, no accessory muscle use. Auscultation: normal & symmetrical air exchange; no rales, no rhonchi, no wheeze; no rubs    Cardiovascular: Palpation: PMI not displaced or enlarged, no thrills or heaves. Auscultation: RRR; no murmur, rubs or gallops. Extremities: no edema, no active varicosity. Gastrointestinal: Normal bowel sounds. No masses; no tenderness; no rebound/rigidity; no CVA tenderness. No hepatosplenomegaly. Psychiatric: Oriented to time, place and person. Musculoskeletal: NC/AT. Neck-supple. Skin: the Zosteriform rash on abdomen @ Lt T9 has healed. The area is not tender    Lab Results   Component Value Date/Time    WBC 8.5 12/26/2016 12:00 PM    HGB 16.0 12/26/2016 12:00 PM    HCT 45.8 12/26/2016 12:00 PM    PLATELET 490 68/51/4835 12:00 PM    MCV 85.3 12/26/2016 12:00 PM     Lab Results   Component Value Date/Time    Sodium 141 12/26/2016 01:15 PM    Potassium 4.0 12/26/2016 01:15 PM    Chloride 105 12/26/2016 01:15 PM    CO2 28 12/26/2016 01:15 PM    Anion gap 8 12/26/2016 01:15 PM    Glucose 92 12/26/2016 01:15 PM    BUN 17 12/26/2016 01:15 PM    Creatinine 0.83 12/26/2016 01:15 PM    BUN/Creatinine ratio 20 12/26/2016 01:15 PM    GFR est AA >60 12/26/2016 01:15 PM    GFR est non-AA >60 12/26/2016 01:15 PM    Calcium 9.0 12/26/2016 01:15 PM    Bilirubin, total 0.5 12/26/2016 01:15 PM    AST (SGOT) 15 12/26/2016 01:15 PM    Alk.  phosphatase 73 12/26/2016 01:15 PM    Protein, total 7.7 12/26/2016 01:15 PM    Albumin 4.0 12/26/2016 01:15 PM    Globulin 3.7 12/26/2016 01:15 PM    A-G Ratio 1.1 12/26/2016 01:15 PM    ALT (SGPT) 13 12/26/2016 01:15 PM     Lab Results   Component Value Date/Time    Cholesterol, total 297 07/07/2016 12:16 PM    HDL Cholesterol 32 07/07/2016 12:16 PM    LDL, calculated 221 07/07/2016 12:16 PM    VLDL, calculated 44 07/07/2016 12:16 PM    Triglyceride 221 07/07/2016 12:16 PM      ASSESSMENT:     1. Hyperlipidemia, unspecified hyperlipidemia type    2. Postherpetic neuralgia    3. Medicare annual wellness visit, subsequent        PLAN:   Pharmacologic Management: Medications reviewed with the patient. No change. Orders Placed This Encounter    LIPID PANEL    ALT    AST     Other Instructions: Discussed DDx, follow-up & work-up. Discussed risk/benefit & side effect of treatment. Lipid lowering high fiber diet with fiber supplement discussed in details. Follow up visit as planned, prn sooner. F/U with GI. Health risk from non adherence discussed. Patient voiced understanding. Follow-up Disposition:  Return in about 3 months (around 7/7/2017).     Lamonte Armando MD

## 2017-04-11 ENCOUNTER — TELEPHONE (OUTPATIENT)
Dept: FAMILY MEDICINE CLINIC | Facility: CLINIC | Age: 72
End: 2017-04-11

## 2017-04-11 LAB
ALT SERPL-CCNC: 11 IU/L (ref 0–32)
AST SERPL-CCNC: 15 IU/L (ref 0–40)
CHOLEST SERPL-MCNC: 308 MG/DL (ref 100–199)
HDLC SERPL-MCNC: 31 MG/DL
INTERPRETATION, 910389: NORMAL
LDLC SERPL CALC-MCNC: 225 MG/DL (ref 0–99)
TRIGL SERPL-MCNC: 262 MG/DL (ref 0–149)
VLDLC SERPL CALC-MCNC: 52 MG/DL (ref 5–40)

## 2017-04-11 NOTE — PROGRESS NOTES
Lipids are even higher. If taking pravastatin regularly, will need to increase. Otherwise please take regularly.

## 2017-04-11 NOTE — TELEPHONE ENCOUNTER
Lipids are even higher.  If taking pravastatin regularly, will need to increase.  Otherwise please take regularly. Spoke with pt in regards to results patient states she has NOT been taking regularly will start once she purchase meds . Pt acknowledges understanding and voices no concerns at this time.

## 2017-07-11 RX ORDER — PRAVASTATIN SODIUM 20 MG/1
TABLET ORAL
Qty: 30 TAB | Refills: 0 | Status: SHIPPED | OUTPATIENT
Start: 2017-07-11 | End: 2017-07-24 | Stop reason: SDUPTHER

## 2017-07-24 ENCOUNTER — OFFICE VISIT (OUTPATIENT)
Dept: FAMILY MEDICINE CLINIC | Facility: CLINIC | Age: 72
End: 2017-07-24

## 2017-07-24 VITALS
DIASTOLIC BLOOD PRESSURE: 78 MMHG | TEMPERATURE: 98.4 F | OXYGEN SATURATION: 97 % | RESPIRATION RATE: 16 BRPM | HEIGHT: 60 IN | SYSTOLIC BLOOD PRESSURE: 126 MMHG | HEART RATE: 68 BPM | WEIGHT: 104.2 LBS | BODY MASS INDEX: 20.46 KG/M2

## 2017-07-24 DIAGNOSIS — E78.5 HYPERLIPIDEMIA, UNSPECIFIED HYPERLIPIDEMIA TYPE: ICD-10-CM

## 2017-07-24 DIAGNOSIS — Z87.442 HISTORY OF KIDNEY STONES: ICD-10-CM

## 2017-07-24 DIAGNOSIS — E78.5 HYPERLIPIDEMIA, UNSPECIFIED HYPERLIPIDEMIA TYPE: Primary | ICD-10-CM

## 2017-07-24 DIAGNOSIS — R25.1 TREMOR OF BOTH HANDS: ICD-10-CM

## 2017-07-24 RX ORDER — PRAVASTATIN SODIUM 20 MG/1
TABLET ORAL
Qty: 90 TAB | Refills: 1 | Status: SHIPPED | OUTPATIENT
Start: 2017-07-24 | End: 2017-10-08 | Stop reason: SDUPTHER

## 2017-07-24 NOTE — PROGRESS NOTES
SUBJECTIVE:  Khadar Goldstein is a 71y.o. year old female   Chief Complaint   Patient presents with    Cholesterol Problem    Shingles    Shaking     hands       History of Present Illness: The singles rash has resolved. The pain the zoster area resolved. She is not needing any pain medication. She is now following diet for lipids. She is taking pravastatin w/o problem. She is not following up with urology for renal calculi. She denies any symptoms from it. She shaking of hands in AM chronically. She denies any motor weakness. Past Medical History:   Diagnosis Date    Calculus of kidney     H/O bronchitis     Shingles      History reviewed. No pertinent surgical history. Current Outpatient Prescriptions   Medication Sig    pravastatin (PRAVACHOL) 20 mg tablet TAKE 1 TABLET BY MOUTH EVERY NIGHT    lactulose (GENERLAC) 10 gram/15 mL solution Take  by mouth as needed. No current facility-administered medications for this visit. No Known Allergies     Family History   Problem Relation Age of Onset    Tremors Mother    [place]  Cancer Mother     No Known Problems Father     Emphysema Maternal Aunt     No Known Problems Maternal Uncle     No Known Problems Maternal Grandmother     Tremors Maternal Grandfather     Hypertension Son     Hypertension Son         Social History   Substance Use Topics    Smoking status: Former Smoker     Packs/day: 0.50     Years: 20.00     Quit date: 6/4/2015    Smokeless tobacco: Never Used    Alcohol use No       Review of Systems:   Constitutional: No fever, chills, night sweats, malaise, dizziness. Cardiovascular: No angina, palpitations, PND, orthopnea, lightheadedness, edema, claudication. Respiratory: No pleurisy, hemoptysis, unusual cough or sputum. Gastrointestinal: No nausea/ vomiting, KYE symptoms, melena, hematochezia. Psychiatric: No agitation, confusion/disorientation, suicidal or homicidal ideation.   Neurological: Intermittent tremor of hands. No seizures, numbness, dizziness, speech abnormality, incontinence. Musculoskeletal: No joint swelling, instability, focal weakness, stiffness/rigidity, radicular pain. OBJECTIVE:  Physical Exam:   Constitutional: General Appearance:  well developed, well nourished, nontoxic, in no acute distress. Visit Vitals    /78 (BP 1 Location: Left arm, BP Patient Position: Sitting)    Pulse 68    Temp 98.4 °F (36.9 °C) (Oral)    Resp 16    Ht 5' (1.524 m)    Wt 104 lb 3.2 oz (47.3 kg)    SpO2 97%    BMI 20.35 kg/m2     Pulmonary: Respiratory effort: normal; no dyspnea, no retractions, no accessory muscle use. Auscultation: normal & symmetrical air exchange; no rales, no rhonchi, no wheeze; no rubs    Cardiovascular: Palpation: PMI not displaced or enlarged, no thrills or heaves. Auscultation: RRR; no murmur, rubs or gallops. Extremities: no edema, no active varicosity. Gastrointestinal: Normal bowel sounds. No masses; no tenderness; no rebound/rigidity; no CVA tenderness. No hepatosplenomegaly. Psychiatric: Oriented to time, place and person. Neurological[de-identified] CN I to XII: intact. DTR: symmetrical & normal. Minimal resting tremor of hands. Musculoskeletal:   NC/AT. Neck-supple. Neck has good ROM. Both upper extremities- No heat, effusion, redness, tenderness. Normal ROM. No instability. No focal weakness. .     Skin: the Zosteriform rash on abdomen @ Lt T9 has healed.  The area is not tender    Lab Results   Component Value Date/Time    WBC 8.5 12/26/2016 12:00 PM    HGB 16.0 12/26/2016 12:00 PM    HCT 45.8 12/26/2016 12:00 PM    PLATELET 986 91/44/2815 12:00 PM    MCV 85.3 12/26/2016 12:00 PM     Lab Results   Component Value Date/Time    Sodium 141 12/26/2016 01:15 PM    Potassium 4.0 12/26/2016 01:15 PM    Chloride 105 12/26/2016 01:15 PM    CO2 28 12/26/2016 01:15 PM    Anion gap 8 12/26/2016 01:15 PM    Glucose 92 12/26/2016 01:15 PM    BUN 17 12/26/2016 01:15 PM    Creatinine 0.83 12/26/2016 01:15 PM    BUN/Creatinine ratio 20 12/26/2016 01:15 PM    GFR est AA >60 12/26/2016 01:15 PM    GFR est non-AA >60 12/26/2016 01:15 PM    Calcium 9.0 12/26/2016 01:15 PM    Bilirubin, total 0.5 12/26/2016 01:15 PM    AST (SGOT) 15 04/10/2017 09:06 AM    Alk. phosphatase 73 12/26/2016 01:15 PM    Protein, total 7.7 12/26/2016 01:15 PM    Albumin 4.0 12/26/2016 01:15 PM    Globulin 3.7 12/26/2016 01:15 PM    A-G Ratio 1.1 12/26/2016 01:15 PM    ALT (SGPT) 11 04/10/2017 09:06 AM     Lab Results   Component Value Date/Time    Cholesterol, total 308 04/10/2017 09:06 AM    HDL Cholesterol 31 04/10/2017 09:06 AM    LDL, calculated 225 04/10/2017 09:06 AM    VLDL, calculated 52 04/10/2017 09:06 AM    Triglyceride 262 04/10/2017 09:06 AM      ASSESSMENT:     1. Hyperlipidemia, unspecified hyperlipidemia type    2. History of kidney stones    3. Tremor of both hands        PLAN:   Pharmacologic Management: Medications reviewed with the patient. No change. Orders Placed This Encounter    LIPID PANEL    AST    ALT    OCCULT BLOOD, IMMUNOASSAY (FIT)    URINALYSIS W/ RFLX MICROSCOPIC    pravastatin (PRAVACHOL) 20 mg tablet    Diphth, Pertus,Acell,, Tetanus (BOOSTRIX TDAP) 2.5-8-5 Lf-mcg-Lf/0.5mL susp susp     Plan of care (Diet and Exercise) discussed in details. She declines referral to neurology. Gentle ROM exercises and posture care. Renal stone regimen. Discussed DDx, follow-up & work-up. Discussed risk/benefit & side effect of treatment. Lipid lowering high fiber diet with fiber supplement discussed in details. Follow up visit as planned, prn sooner. Health risk from non adherence discussed. Patient voiced understanding. Follow-up Disposition:  Return in about 3 months (around 10/24/2017).     Arun Nunez MD

## 2017-07-24 NOTE — PROGRESS NOTES
Melissa Lopez is a 71 y.o.  female presents today for office visit for routine follow up. Pt is in Room # 4.      1. Have you been to the ER, urgent care clinic since your last visit? Hospitalized since your last visit? No    2. Have you seen or consulted any other health care providers outside of the [address] [place] [address] since your last visit? Include any pap smears or colon screening. No      Health Maintenance reviewed.

## 2017-07-25 ENCOUNTER — TELEPHONE (OUTPATIENT)
Dept: FAMILY MEDICINE CLINIC | Facility: CLINIC | Age: 72
End: 2017-07-25

## 2017-07-25 LAB
ALT SERPL-CCNC: 13 IU/L (ref 0–32)
APPEARANCE UR: CLEAR
AST SERPL-CCNC: 18 IU/L (ref 0–40)
BACTERIA #/AREA URNS HPF: ABNORMAL /[HPF]
BILIRUB UR QL STRIP: NEGATIVE
CASTS URNS QL MICRO: ABNORMAL /LPF
CHOLEST SERPL-MCNC: 221 MG/DL (ref 100–199)
COLOR UR: YELLOW
EPI CELLS #/AREA URNS HPF: ABNORMAL /HPF
GLUCOSE UR QL: NEGATIVE
HDLC SERPL-MCNC: 31 MG/DL
HGB UR QL STRIP: ABNORMAL
INTERPRETATION, 910389: NORMAL
KETONES UR QL STRIP: NEGATIVE
LDLC SERPL CALC-MCNC: 141 MG/DL (ref 0–99)
LEUKOCYTE ESTERASE UR QL STRIP: NEGATIVE
MICRO URNS: ABNORMAL
MUCOUS THREADS URNS QL MICRO: PRESENT
NITRITE UR QL STRIP: NEGATIVE
PH UR STRIP: 6 [PH] (ref 5–7.5)
PROT UR QL STRIP: NEGATIVE
RBC #/AREA URNS HPF: ABNORMAL /HPF
SP GR UR: 1.01 (ref 1–1.03)
TRIGL SERPL-MCNC: 245 MG/DL (ref 0–149)
UROBILINOGEN UR STRIP-MCNC: 0.2 MG/DL (ref 0.2–1)
VLDLC SERPL CALC-MCNC: 49 MG/DL (ref 5–40)
WBC #/AREA URNS HPF: ABNORMAL /HPF

## 2017-07-25 NOTE — PROGRESS NOTES
Urine test was stable. LDL cholesterol was improved; but still high. Triglyceride was still high. The patient is advised to continue with diet and exercise.

## 2017-07-25 NOTE — TELEPHONE ENCOUNTER
Called pt and left message. Call back number left and I myself or one of the other nurses will attempt to contact again. The call was to inform pt of lab results .

## 2017-08-01 LAB — HEMOCCULT STL QL IA: NEGATIVE

## 2017-10-06 ENCOUNTER — OFFICE VISIT (OUTPATIENT)
Dept: FAMILY MEDICINE CLINIC | Facility: CLINIC | Age: 72
End: 2017-10-06

## 2017-10-06 ENCOUNTER — HOSPITAL ENCOUNTER (OUTPATIENT)
Dept: LAB | Age: 72
Discharge: HOME | End: 2017-10-06

## 2017-10-06 VITALS
BODY MASS INDEX: 20.42 KG/M2 | WEIGHT: 104 LBS | DIASTOLIC BLOOD PRESSURE: 62 MMHG | TEMPERATURE: 97.4 F | SYSTOLIC BLOOD PRESSURE: 134 MMHG | OXYGEN SATURATION: 96 % | HEART RATE: 58 BPM | RESPIRATION RATE: 13 BRPM | HEIGHT: 60 IN

## 2017-10-06 DIAGNOSIS — G25.0 BENIGN FAMILIAL TREMOR: ICD-10-CM

## 2017-10-06 DIAGNOSIS — E78.5 HYPERLIPIDEMIA, UNSPECIFIED HYPERLIPIDEMIA TYPE: ICD-10-CM

## 2017-10-06 DIAGNOSIS — N20.0 RENAL CALCULI: Primary | ICD-10-CM

## 2017-10-06 PROCEDURE — 99001 SPECIMEN HANDLING PT-LAB: CPT | Performed by: FAMILY MEDICINE

## 2017-10-06 NOTE — PROGRESS NOTES
Poppy Dunham is a 71 y.o.  female presents today for FU. 1. Have you been to the ER, urgent care clinic since your last visit? Hospitalized since your last visit? No    2. Have you seen or consulted any other health care providers outside of the [address] [place] [address] since your last visit? Include any pap smears or colon screening.  No    Health Maintenance reviewed - .    MD Lars Eduardo , SANTOSN

## 2017-10-06 NOTE — PROGRESS NOTES
SUBJECTIVE:  Lesly Meade is a 71y.o. year old female   Chief Complaint   Patient presents with    Cholesterol Problem    Shaking    Shingles       History of Present Illness:     She is now following diet for lipids. She is taking pravastatin w/o problem. She is not following up with urology for renal calculi. She denies any symptoms from it. She shaking of hands in AM chronically. She denies any motor weakness. The singles rash has resolved. The pain the zoster area resolved. She is not needing any pain medication. Past Medical History:   Diagnosis Date    Calculus of kidney     H/O bronchitis     Shingles      History reviewed. No pertinent surgical history. Current Outpatient Prescriptions   Medication Sig    pravastatin (PRAVACHOL) 20 mg tablet TAKE 1 TABLET BY MOUTH EVERY NIGHT     No current facility-administered medications for this visit. No Known Allergies     Family History   Problem Relation Age of Onset    Tremors Mother    [address] [facility] O[facility] Cancer Mother     No Known Problems Father     Emphysema Maternal Aunt     No Known Problems Maternal Uncle     No Known Problems Maternal Grandmother     Tremors Maternal Grandfather     Hypertension Son     Hypertension Son         Social History   Substance Use Topics    Smoking status: Former Smoker     Packs/day: 0.50     Years: 20.00     Quit date: 6/4/2015    Smokeless tobacco: Never Used    Alcohol use No       Review of Systems:   Constitutional: No fever, chills, night sweats, malaise, dizziness. Cardiovascular: No angina, palpitations, PND, orthopnea, lightheadedness, edema, claudication. Respiratory: No pleurisy, hemoptysis, unusual cough or sputum. Gastrointestinal: No nausea/ vomiting, KYE symptoms, melena, hematochezia. Psychiatric: No agitation, confusion/disorientation, suicidal or homicidal ideation. Neurological: Intermittent tremor of hands.  No seizures, numbness, dizziness, speech abnormality, incontinence. Musculoskeletal: No joint swelling, instability, focal weakness, stiffness/rigidity, radicular pain. OBJECTIVE:  Physical Exam:   Constitutional: General Appearance:  well developed, well nourished, nontoxic, in no acute distress. Visit Vitals    /62 (BP 1 Location: Right arm, BP Patient Position: Sitting)    Pulse (!) 58    Temp 97.4 °F (36.3 °C) (Oral)    Resp 13    Ht 5' (1.524 m)    Wt 104 lb (47.2 kg)    SpO2 96%    BMI 20.31 kg/m2     Pulmonary: Respiratory effort: normal; no dyspnea, no retractions, no accessory muscle use. Auscultation: normal & symmetrical air exchange; no rales, no rhonchi, no wheeze; no rubs    Cardiovascular[de-identified] Palpation of Heart: PMI not displaced or enlarged, no thrills, no heaves. Auscultation of Heart: RRR; No murmur, no rubs, no gallops. Neck: carotid arteries- normal volume & without bruit; no JVD. Extremities: no edema, no active varicosity. Gastrointestinal: Normal bowel sounds. No masses; no tenderness; no rebound/rigidity; no CVA tenderness. No hepatosplenomegaly. Psychiatric: Oriented to time, place and person. Neurological[de-identified] CN I to XII: intact. DTR: symmetrical & normal. Minimal resting tremor of hands. Musculoskeletal:   NC/AT. Neck-supple.      Lab Results   Component Value Date/Time    WBC 8.5 12/26/2016 12:00 PM    HGB 16.0 12/26/2016 12:00 PM    HCT 45.8 12/26/2016 12:00 PM    PLATELET 174 32/25/7715 12:00 PM    MCV 85.3 12/26/2016 12:00 PM     Lab Results   Component Value Date/Time    Sodium 141 12/26/2016 01:15 PM    Potassium 4.0 12/26/2016 01:15 PM    Chloride 105 12/26/2016 01:15 PM    CO2 28 12/26/2016 01:15 PM    Anion gap 8 12/26/2016 01:15 PM    Glucose 92 12/26/2016 01:15 PM    BUN 17 12/26/2016 01:15 PM    Creatinine 0.83 12/26/2016 01:15 PM    BUN/Creatinine ratio 20 12/26/2016 01:15 PM    GFR est AA >60 12/26/2016 01:15 PM    GFR est non-AA >60 12/26/2016 01:15 PM    Calcium 9.0 12/26/2016 01:15 PM Bilirubin, total 0.5 12/26/2016 01:15 PM    AST (SGOT) 18 07/24/2017 12:00 AM    Alk. phosphatase 73 12/26/2016 01:15 PM    Protein, total 7.7 12/26/2016 01:15 PM    Albumin 4.0 12/26/2016 01:15 PM    Globulin 3.7 12/26/2016 01:15 PM    A-G Ratio 1.1 12/26/2016 01:15 PM    ALT (SGPT) 13 07/24/2017 12:00 AM     Lab Results   Component Value Date/Time    Cholesterol, total 221 07/24/2017 12:00 AM    HDL Cholesterol 31 07/24/2017 12:00 AM    LDL, calculated 141 07/24/2017 12:00 AM    VLDL, calculated 49 07/24/2017 12:00 AM    Triglyceride 245 07/24/2017 12:00 AM      Lab Results   Component Value Date/Time    TSH 1.480 03/08/2016 12:00 AM       ASSESSMENT:     1. Renal calculi    2. Hyperlipidemia, unspecified hyperlipidemia type    3. Benign familial tremor        PLAN:   Pharmacologic Management: Medications reviewed with the patient. No change. Orders Placed This Encounter    [facility]    LIPID PANEL     HM: She is not sure about last Tdap/ Td. She declined all immunizations, however. Plan of care (Diet and Exercise) discussed in details. She declines referral to neurology or urology. Renal stone regimen. Discussed DDx, follow-up & work-up. Discussed risk/benefit & side effect of treatment. Lipid lowering high fiber diet with fiber supplement discussed in details. Follow up visit as planned, prn sooner. Health risk from non adherence discussed. Patient voiced understanding. Follow-up Disposition:  Return in about 2 months (around 12/6/2017) for fasting.     Chanell Watts MD

## 2017-10-07 LAB
ALT SERPL-CCNC: 12 IU/L (ref 0–32)
AST SERPL-CCNC: 19 IU/L (ref 0–40)
CHOLEST SERPL-MCNC: 225 MG/DL (ref 100–199)
HDLC SERPL-MCNC: 30 MG/DL
INTERPRETATION, 910389: NORMAL
LDLC SERPL CALC-MCNC: 150 MG/DL (ref 0–99)
TRIGL SERPL-MCNC: 226 MG/DL (ref 0–149)
VLDLC SERPL CALC-MCNC: 45 MG/DL (ref 5–40)

## 2017-10-08 RX ORDER — PRAVASTATIN SODIUM 40 MG/1
TABLET ORAL
Qty: 90 TAB | Refills: 1 | Status: SHIPPED | OUTPATIENT
Start: 2017-10-08 | End: 2018-06-26 | Stop reason: SDUPTHER

## 2017-10-09 ENCOUNTER — TELEPHONE (OUTPATIENT)
Dept: FAMILY MEDICINE CLINIC | Facility: CLINIC | Age: 72
End: 2017-10-09

## 2017-10-09 NOTE — PROGRESS NOTES
Lipids are no better. Increase Pravastatin to one 40 mg tab (or two 20 mg tabs) nightly. Sent to [facility].

## 2017-10-09 NOTE — TELEPHONE ENCOUNTER
Spoke with pt in regards to lab results. Two pt identifier's and permission to release verified. Relayed 's notes. Pt acknowledges understanding and states she is agreeable to the medication dosage increase. Pt voices no concerns at this time.

## 2017-10-16 ENCOUNTER — HOSPITAL ENCOUNTER (OUTPATIENT)
Dept: ULTRASOUND IMAGING | Age: 72
Discharge: HOME | End: 2017-10-16
Attending: FAMILY MEDICINE
Payer: COMMERCIAL

## 2017-10-16 DIAGNOSIS — N20.0 RENAL CALCULI: ICD-10-CM

## 2017-10-16 PROCEDURE — 76775 US EXAM ABDO BACK WALL LIM: CPT

## 2017-10-17 ENCOUNTER — TELEPHONE (OUTPATIENT)
Dept: FAMILY MEDICINE CLINIC | Facility: CLINIC | Age: 72
End: 2017-10-17

## 2017-10-17 NOTE — TELEPHONE ENCOUNTER
Spoke with pt in regards to US of the kidneys results. Two pt identifier's and permission to release verified. Relayed 's notes. Pt acknowledges understanding and voices no concerns at this time. Pt is scheduled a fasting f/u appt with P.A. Pumphrey on 12/7/17.

## 2017-12-07 ENCOUNTER — OFFICE VISIT (OUTPATIENT)
Dept: FAMILY MEDICINE CLINIC | Facility: CLINIC | Age: 72
End: 2017-12-07

## 2017-12-07 VITALS
HEIGHT: 60 IN | OXYGEN SATURATION: 96 % | BODY MASS INDEX: 21.01 KG/M2 | RESPIRATION RATE: 15 BRPM | DIASTOLIC BLOOD PRESSURE: 83 MMHG | HEART RATE: 68 BPM | SYSTOLIC BLOOD PRESSURE: 136 MMHG | WEIGHT: 107 LBS | TEMPERATURE: 95.4 F

## 2017-12-07 DIAGNOSIS — G47.00 INSOMNIA, UNSPECIFIED TYPE: ICD-10-CM

## 2017-12-07 DIAGNOSIS — E78.5 HYPERLIPIDEMIA, UNSPECIFIED HYPERLIPIDEMIA TYPE: Primary | ICD-10-CM

## 2017-12-07 DIAGNOSIS — G25.0 BENIGN FAMILIAL TREMOR: ICD-10-CM

## 2017-12-07 DIAGNOSIS — F41.9 ANXIETY: ICD-10-CM

## 2017-12-07 NOTE — PROGRESS NOTES
Brayden Gregory is a 71 y.o.  female presents today for office visit for follow up. Pt would also like to discuss HM. Pt is not fasting. Pt is in Room # 8      1. Have you been to the ER, urgent care clinic since your last visit? Hospitalized since your last visit? No    2. Have you seen or consulted any other health care providers outside of the [address] [place] [address] since your last visit? Include any pap smears or colon screening. No    Upcoming Appts  none    Health Maintenance reviewed       VORB: No orders of the defined types were placed in this encounter.   Saarh Alcala LPN

## 2017-12-07 NOTE — MR AVS SNAPSHOT
Visit Information Date & Time Provider Department Dept. Phone Encounter #  
 12/7/2017 10:00 AM [facility] [phone] [id number] Follow-up Instructions Return in about 3 months (around 3/7/2018). Upcoming Health Maintenance Date Due DTaP/Tdap/Td series (1 - Tdap) 1/13/1966 BREAST CANCER SCRN MAMMOGRAM 3/10/2018 Pneumococcal 65+ Low/Medium Risk (2 of 2 - PPSV23) 4/7/2018 MEDICARE YEARLY EXAM 4/8/2018 FOBT Q 1 YEAR AGE 50-75 7/31/2018 GLAUCOMA SCREENING Q2Y 4/7/2019 Allergies as of 12/7/2017  Review Complete On: 12/7/2017 By: Alisha Davenprot LPN No Known Allergies Current Immunizations  Never Reviewed No immunizations on file. Not reviewed this visit You Were Diagnosed With   
  
 Codes Comments Hyperlipidemia, unspecified hyperlipidemia type    -  Primary ICD-10-CM: E78.5 ICD-9-CM: 272.4 Benign familial tremor     ICD-10-CM: G25.0 ICD-9-CM: 333.1 Anxiety     ICD-10-CM: F41.9 ICD-9-CM: 300.00 Insomnia, unspecified type     ICD-10-CM: G47.00 ICD-9-CM: 780.52 Vitals BP Pulse Temp Resp Height(growth percentile) Weight(growth percentile) 136/83 (BP 1 Location: Right arm, BP Patient Position: Sitting) 68 95.4 °F (35.2 °C) (Oral) 15 5' (1.524 m) 107 lb (48.5 kg) SpO2 BMI OB Status Smoking Status 96% 20.9 kg/m2 Postmenopausal Former Smoker BMI and BSA Data Body Mass Index Body Surface Area  
 20.9 kg/m 2 1.43 m 2 Preferred Pharmacy Pharmacy Name Phone [facility] [address] [phone] Your Updated Medication List  
  
   
This list is accurate as of: 12/7/17 10:20 AM.  Always use your most recent med list.  
  
  
  
  
 diph,Pertuss(Acell),Tet Vac-PF 2 Lf-(2.5-5-3-5 mcg)-5Lf/0.5 mL susp Commonly known as:  ADACEL  
 0.5 mL by IntraMUSCular route once for 1 dose. pravastatin 40 mg tablet Commonly known as:  PRAVACHOL  
TAKE 1 TABLET BY MOUTH EVERY NIGHT Prescriptions Printed Refills diph,Pertuss,Acell,,Tet Vac-PF (ADACEL) 2 Lf-(2.5-5-3-5 mcg)-5Lf/0.5 mL susp 0 Si.5 mL by IntraMUSCular route once for 1 dose. Class: Print Route: IntraMUSCular Follow-up Instructions Return in about 3 months (around 3/7/2018). To-Do List   
 2017 Lab:  LIPID PANEL   
  
 2017 Lab:  METABOLIC PANEL, COMPREHENSIVE Patient Instructions Anxiety Disorder: Care Instructions Your Care Instructions Anxiety is a normal reaction to stress. Difficult situations can cause you to have symptoms such as sweaty palms and a nervous feeling. In an anxiety disorder, the symptoms are far more severe. Constant worry, muscle tension, trouble sleeping, nausea and diarrhea, and other symptoms can make normal daily activities difficult or impossible. These symptoms may occur for no reason, and they can affect your work, school, or social life. Medicines, counseling, and self-care can all help. Follow-up care is a key part of your treatment and safety. Be sure to make and go to all appointments, and call your doctor if you are having problems. It's also a good idea to know your test results and keep a list of the medicines you take. How can you care for yourself at home? · Take medicines exactly as directed. Call your doctor if you think you are having a problem with your medicine. · Go to your counseling sessions and follow-up appointments. · Recognize and accept your anxiety. Then, when you are in a situation that makes you anxious, say to yourself, \"This is not an emergency. I feel uncomfortable, but I am not in danger. I can keep going even if I feel anxious. \" · Be kind to your body: ¨ Relieve tension with exercise or a massage.  
¨ Get enough rest. 
 ¨ Avoid alcohol, caffeine, nicotine, and illegal drugs. They can increase your anxiety level and cause sleep problems. ¨ Learn and do relaxation techniques. See below for more about these techniques. · Engage your mind. Get out and do something you enjoy. Go to a funny movie, or take a walk or hike. Plan your day. Having too much or too little to do can make you anxious. · Keep a record of your symptoms. Discuss your fears with a good friend or family member, or join a support group for people with similar problems. Talking to others sometimes relieves stress. · Get involved in social groups, or volunteer to help others. Being alone sometimes makes things seem worse than they are. · Get at least 30 minutes of exercise on most days of the week to relieve stress. Walking is a good choice. You also may want to do other activities, such as running, swimming, cycling, or playing tennis or team sports. Relaxation techniques Do relaxation exercises 10 to 20 minutes a day. You can play soothing, relaxing music while you do them, if you wish. · Tell others in your house that you are going to do your relaxation exercises. Ask them not to disturb you. · Find a comfortable place, away from all distractions and noise. · Lie down on your back, or sit with your back straight. · Focus on your breathing. Make it slow and steady. · Breathe in through your nose. Breathe out through either your nose or mouth. · Breathe deeply, filling up the area between your navel and your rib cage. Breathe so that your belly goes up and down. · Do not hold your breath. · Breathe like this for 5 to 10 minutes. Notice the feeling of calmness throughout your whole body. As you continue to breathe slowly and deeply, relax by doing the following for another 5 to 10 minutes: · Tighten and relax each muscle group in your body. You can begin at your toes and work your way up to your head. · Imagine your muscle groups relaxing and becoming heavy. · Empty your mind of all thoughts. · Let yourself relax more and more deeply. · Become aware of the state of calmness that surrounds you. · When your relaxation time is over, you can bring yourself back to alertness by moving your fingers and toes and then your hands and feet and then stretching and moving your entire body. Sometimes people fall asleep during relaxation, but they usually wake up shortly afterward. · Always give yourself time to return to full alertness before you drive a car or do anything that might cause an accident if you are not fully alert. Never play a relaxation tape while you drive a car. When should you call for help? Call 911 anytime you think you may need emergency care. For example, call if: 
? · You feel you cannot stop from hurting yourself or someone else. ? Keep the numbers for these national suicide hotlines: 0-378-681-TALK (1-909.287.7104) and 6-942-PJFLCAW (9-380.506.6727). If you or someone you know talks about suicide or feeling hopeless, get help right away. ? Watch closely for changes in your health, and be sure to contact your doctor if: 
? · You have anxiety or fear that affects your life. ? · You have symptoms of anxiety that are new or different from those you had before. Where can you learn more? Go to http://zia-jono.info/. Enter P754 in the search box to learn more about \"Anxiety Disorder: Care Instructions. \" Current as of: May 12, 2017 Content Version: 11.4 © 8687-1296 [employer]. Care instructions adapted under license by [employer] (which disclaims liability or warranty for this information). If you have questions about a medical condition or this instruction, always ask your healthcare professional. [address] any warranty or liability for your use of this information. Introducing [facility]! Arina Perez introduces [employer] patient portal. Now you can access parts of your medical record, email your doctor's office, and request medication refills online. 1. In your internet browser, go to [email or web address] 2. Click on the First Time User? Click Here link in the Sign In box. You will see the New Member Sign Up page. 3. Enter your [employer] Access Code exactly as it appears below. You will not need to use this code after youve completed the sign-up process. If you do not sign up before the expiration date, you must request a new code. · [employer] Access Code: [email or web address] Expires: 3/7/2018 10:20 AM 
 
4. Enter the last four digits of your Social Security Number (xxxx) and Date of Birth (mm/dd/yyyy) as indicated and click Submit. You will be taken to the next sign-up page. 5. Create a [employer] ID. This will be your [employer] login ID and cannot be changed, so think of one that is secure and easy to remember. 6. Create a [employer] password. You can change your password at any time. 7. Enter your Password Reset Question and Answer. This can be used at a later time if you forget your password. 8. Enter your e-mail address. You will receive e-mail notification when new information is available in [address] 9. Click Sign Up. You can now view and download portions of your medical record. 10. Click the Download Summary menu link to download a portable copy of your medical information. If you have questions, please visit the Frequently Asked Questions section of the [employer] website. Remember, [employer] is NOT to be used for urgent needs. For medical emergencies, dial 911. Now available from your iPhone and Android! Please provide this summary of care documentation to your next provider. Your primary care clinician is listed as [address] [place] [address]. If you have any questions after today's visit, please call [phone].

## 2017-12-07 NOTE — PROGRESS NOTES
History and Physical    Patient: Berenice Cowan MRN: [id number]  SSN: [id number]    YOB: 1945  Age: 71 y.o. Sex: female      Subjective:      Berenice Cowan is a 71 y.o. female who presents today for follow up of HLD and benign tremor. In terms of her HLD, she is on Pravachol, she is not fasting. She states she has been out of it the past few days, has a refill at the pharmacy waiting. She has a h/o benign tremor, she had previously declined referral to neurology. She states it is only in her hands, she states it is not all the time. Patient c/o new problem with anxiety and insomnia, requesting valium. She has problems falling asleep. She states she reads in her room. Patient has a h/o kidney stones, denies flank pain, dysuria etc.    Last Colonoscopy:  fit 7/2017-negative  Last Mammogram:3/2016 BI-RAD 2  Last DEXA: 3/2016      PMH:  Past Medical History:   Diagnosis Date    Calculus of kidney     H/O bronchitis     Shingles      History reviewed. No pertinent surgical history. FamHx:  Family History   Problem Relation Age of Onset    Tremors Mother    Tigist Caitlin Cancer Mother     No Known Problems Father     Emphysema Maternal Aunt     No Known Problems Maternal Uncle     No Known Problems Maternal Grandmother     Tremors Maternal Grandfather     Hypertension Son     Hypertension Son        SocialHx:  Social History   Substance Use Topics    Smoking status: Former Smoker     Packs/day: 0.50     Years: 20.00     Quit date: 6/4/2015    Smokeless tobacco: Never Used    Alcohol use No        Meds:  Prior to Admission medications    Medication Sig Start Date End Date Taking? Authorizing Provider   diph,Felicia Mae,,Marisol Vac-PF (ADACEL) 2 Lf-(2.5-5-3-5 mcg)-5Lf/0.5 mL susp 0.5 mL by IntraMUSCular route once for 1 dose.  12/7/17 12/7/17 Yes Jessica Pumphrey V, PA   pravastatin (PRAVACHOL) 40 mg tablet TAKE 1 TABLET BY MOUTH EVERY NIGHT 10/8/17  Yes Abdias ROMERO MD Shavon        Allergies:  No Known Allergies    Review of Systems:  Items in Bold are positive  Constitutional: negative for fevers, chills and malaise  Eyes: negative for visual disturbance  Ears, Nose, Mouth, Throat, and Face: negative for nasal congestion  Respiratory: negative for cough or SOB  Cardiovascular: negative for chest pain, chest pressure/discomfort  Gastrointestinal: negative for nausea, vomiting, melena, diarrhea, constipation and abdominal pain  Genitourinary:negative for frequency, dysuria or hematuria  Musculoskeletal:negative for myalgias and arthralgias  Neurological: tremor of hands negative for headaches, dizziness and paresthesia  Psych: anxiety and insomnia denies depression    Objective:     Visit Vitals    /83 (BP 1 Location: Right arm, BP Patient Position: Sitting)    Pulse 68    Temp 95.4 °F (35.2 °C) (Oral)    Resp 15    Ht 5' (1.524 m)    Wt 107 lb (48.5 kg)    SpO2 96%    BMI 20.9 kg/m2       Physical Exam:  GENERAL: alert, cooperative, no distress, appears stated age  [de-identified]: EYE: conjunctivae/corneas clear. EOM's intact. EAR: TM's pearly gray bilaterally NOSE: Nasal mucosa pink and moist bilaterally, THROAT: no erythema or edema  THYROID: no thyromegaly  NECK: no adenopathy  LUNG: clear to auscultation bilaterally  HEART: regular rate and rhythm, S1, S2 normal, no murmur, click, rub or gallop  ABDOMEN: soft, non-tender. Bowel sounds normal. No masses  EXTREMITIES:  extremities normal, atraumatic, no cyanosis or edema  NEUROLOGIC: AOx3. Gait normal. Tremor of lips  PSYCH: Mood: anxiety  Affect: neutral      Assessment and Plan:       ICD-10-CM ICD-9-CM    1. Hyperlipidemia, unspecified hyperlipidemia type E78.5 272.4 LIPID PANEL      METABOLIC PANEL, COMPREHENSIVE   2. Benign familial tremor G25.0 333.1    3. Anxiety F41.9 300.00    4.  Insomnia, unspecified type G47.00 780.52          Medical Decision Making:  HLD-labs- needs follow up    Benign Tremor- continue to monitor- patient declines referral to neurology    Anxiety- continue to monitor- patient declined referral to psych    Insomnia- sleep hygiene education given-declined referral to psych    Follow-up Disposition:  Return in about 3 months (around 3/7/2018). Patient acknowledges understanding of instructions and acknowledges understanding to call back if current symptoms worsen or new symptoms arise. Patient acknowledges and agrees with plan.         Signed By: RICK Fay     December 7, 2017

## 2017-12-07 NOTE — PATIENT INSTRUCTIONS
Anxiety Disorder: Care Instructions  Your Care Instructions    Anxiety is a normal reaction to stress. Difficult situations can cause you to have symptoms such as sweaty palms and a nervous feeling. In an anxiety disorder, the symptoms are far more severe. Constant worry, muscle tension, trouble sleeping, nausea and diarrhea, and other symptoms can make normal daily activities difficult or impossible. These symptoms may occur for no reason, and they can affect your work, school, or social life. Medicines, counseling, and self-care can all help. Follow-up care is a key part of your treatment and safety. Be sure to make and go to all appointments, and call your doctor if you are having problems. It's also a good idea to know your test results and keep a list of the medicines you take. How can you care for yourself at home? · Take medicines exactly as directed. Call your doctor if you think you are having a problem with your medicine. · Go to your counseling sessions and follow-up appointments. · Recognize and accept your anxiety. Then, when you are in a situation that makes you anxious, say to yourself, \"This is not an emergency. I feel uncomfortable, but I am not in danger. I can keep going even if I feel anxious. \"  · Be kind to your body:  ¨ Relieve tension with exercise or a massage. ¨ Get enough rest.  ¨ Avoid alcohol, caffeine, nicotine, and illegal drugs. They can increase your anxiety level and cause sleep problems. ¨ Learn and do relaxation techniques. See below for more about these techniques. · Engage your mind. Get out and do something you enjoy. Go to a funny movie, or take a walk or hike. Plan your day. Having too much or too little to do can make you anxious. · Keep a record of your symptoms. Discuss your fears with a good friend or family member, or join a support group for people with similar problems. Talking to others sometimes relieves stress.   · Get involved in social groups, or volunteer to help others. Being alone sometimes makes things seem worse than they are. · Get at least 30 minutes of exercise on most days of the week to relieve stress. Walking is a good choice. You also may want to do other activities, such as running, swimming, cycling, or playing tennis or team sports. Relaxation techniques  Do relaxation exercises 10 to 20 minutes a day. You can play soothing, relaxing music while you do them, if you wish. · Tell others in your house that you are going to do your relaxation exercises. Ask them not to disturb you. · Find a comfortable place, away from all distractions and noise. · Lie down on your back, or sit with your back straight. · Focus on your breathing. Make it slow and steady. · Breathe in through your nose. Breathe out through either your nose or mouth. · Breathe deeply, filling up the area between your navel and your rib cage. Breathe so that your belly goes up and down. · Do not hold your breath. · Breathe like this for 5 to 10 minutes. Notice the feeling of calmness throughout your whole body. As you continue to breathe slowly and deeply, relax by doing the following for another 5 to 10 minutes:  · Tighten and relax each muscle group in your body. You can begin at your toes and work your way up to your head. · Imagine your muscle groups relaxing and becoming heavy. · Empty your mind of all thoughts. · Let yourself relax more and more deeply. · Become aware of the state of calmness that surrounds you. · When your relaxation time is over, you can bring yourself back to alertness by moving your fingers and toes and then your hands and feet and then stretching and moving your entire body. Sometimes people fall asleep during relaxation, but they usually wake up shortly afterward. · Always give yourself time to return to full alertness before you drive a car or do anything that might cause an accident if you are not fully alert.  Never play a relaxation tape while you drive a car. When should you call for help? Call 911 anytime you think you may need emergency care. For example, call if:  ? · You feel you cannot stop from hurting yourself or someone else. ? Keep the numbers for these national suicide hotlines: 2-996-909-TALK (9-122.211.1345) and 3-775-AWLJGQN (8-277.459.5944). If you or someone you know talks about suicide or feeling hopeless, get help right away. ? Watch closely for changes in your health, and be sure to contact your doctor if:  ? · You have anxiety or fear that affects your life. ? · You have symptoms of anxiety that are new or different from those you had before. Where can you learn more? Go to http://zia-jono.info/. Enter P754 in the search box to learn more about \"Anxiety Disorder: Care Instructions. \"  Current as of: May 12, 2017  Content Version: 11.4  © 9850-8544 [employer]wise, Inc[employer]. Care instructions adapted under license by [employer] (which disclaims liability or warranty for this information). If you have questions about a medical condition or this instruction, always ask your healthcare professional. [address] any warranty or liability for your use of this information.

## 2017-12-21 DIAGNOSIS — E78.5 HYPERLIPIDEMIA, UNSPECIFIED HYPERLIPIDEMIA TYPE: ICD-10-CM

## 2018-06-12 ENCOUNTER — HOSPITAL ENCOUNTER (OUTPATIENT)
Dept: LAB | Age: 73
Discharge: HOME | End: 2018-06-12
Payer: MEDICARE

## 2018-06-12 ENCOUNTER — OFFICE VISIT (OUTPATIENT)
Dept: FAMILY MEDICINE CLINIC | Facility: CLINIC | Age: 73
End: 2018-06-12

## 2018-06-12 ENCOUNTER — TELEPHONE (OUTPATIENT)
Dept: FAMILY MEDICINE CLINIC | Facility: CLINIC | Age: 73
End: 2018-06-12

## 2018-06-12 VITALS
TEMPERATURE: 96.5 F | OXYGEN SATURATION: 98 % | RESPIRATION RATE: 13 BRPM | DIASTOLIC BLOOD PRESSURE: 59 MMHG | WEIGHT: 100.8 LBS | BODY MASS INDEX: 19.79 KG/M2 | HEIGHT: 60 IN | SYSTOLIC BLOOD PRESSURE: 111 MMHG | HEART RATE: 68 BPM

## 2018-06-12 DIAGNOSIS — Z87.442 HISTORY OF KIDNEY STONES: ICD-10-CM

## 2018-06-12 DIAGNOSIS — E78.5 HYPERLIPIDEMIA, UNSPECIFIED HYPERLIPIDEMIA TYPE: Primary | ICD-10-CM

## 2018-06-12 DIAGNOSIS — E78.5 HYPERLIPIDEMIA, UNSPECIFIED HYPERLIPIDEMIA TYPE: ICD-10-CM

## 2018-06-12 DIAGNOSIS — Z12.11 SCREENING FOR COLON CANCER: ICD-10-CM

## 2018-06-12 DIAGNOSIS — Z12.31 SCREENING MAMMOGRAM, ENCOUNTER FOR: ICD-10-CM

## 2018-06-12 DIAGNOSIS — G25.0 BENIGN FAMILIAL TREMOR: ICD-10-CM

## 2018-06-12 DIAGNOSIS — Z23 ENCOUNTER FOR VACCINATION: ICD-10-CM

## 2018-06-12 DIAGNOSIS — R94.4 DECREASED GFR: Primary | ICD-10-CM

## 2018-06-12 LAB
ALBUMIN SERPL-MCNC: 3.9 G/DL (ref 3.4–5)
ALBUMIN/GLOB SERPL: 1.1 {RATIO} (ref 0.8–1.7)
ALP SERPL-CCNC: 93 U/L (ref 45–117)
ALT SERPL-CCNC: 17 U/L (ref 13–56)
ANION GAP SERPL CALC-SCNC: 6 MMOL/L (ref 3–18)
AST SERPL-CCNC: 19 U/L (ref 15–37)
BILIRUB SERPL-MCNC: 0.5 MG/DL (ref 0.2–1)
BUN SERPL-MCNC: 8 MG/DL (ref 7–18)
BUN/CREAT SERPL: 8 (ref 12–20)
CALCIUM SERPL-MCNC: 9.1 MG/DL (ref 8.5–10.1)
CHLORIDE SERPL-SCNC: 101 MMOL/L (ref 100–108)
CHOLEST SERPL-MCNC: 221 MG/DL
CO2 SERPL-SCNC: 31 MMOL/L (ref 21–32)
CREAT SERPL-MCNC: 0.95 MG/DL (ref 0.6–1.3)
GLOBULIN SER CALC-MCNC: 3.6 G/DL (ref 2–4)
GLUCOSE SERPL-MCNC: 82 MG/DL (ref 74–99)
HDLC SERPL-MCNC: 33 MG/DL (ref 40–60)
HDLC SERPL: 6.7 {RATIO} (ref 0–5)
LDLC SERPL CALC-MCNC: 145.8 MG/DL (ref 0–100)
LIPID PROFILE,FLP: ABNORMAL
POTASSIUM SERPL-SCNC: 4.3 MMOL/L (ref 3.5–5.5)
PROT SERPL-MCNC: 7.5 G/DL (ref 6.4–8.2)
SODIUM SERPL-SCNC: 138 MMOL/L (ref 136–145)
TRIGL SERPL-MCNC: 211 MG/DL (ref ?–150)
VLDLC SERPL CALC-MCNC: 42.2 MG/DL

## 2018-06-12 PROCEDURE — 80053 COMPREHEN METABOLIC PANEL: CPT | Performed by: INTERNAL MEDICINE

## 2018-06-12 PROCEDURE — 80061 LIPID PANEL: CPT | Performed by: INTERNAL MEDICINE

## 2018-06-12 PROCEDURE — 36415 COLL VENOUS BLD VENIPUNCTURE: CPT | Performed by: INTERNAL MEDICINE

## 2018-06-12 NOTE — PROGRESS NOTES
Kamla Chapman is a 68 y. o.@ presents today for office visit for follow up. Pt is in Room # 5.     1. Have you been to the ER, urgent care clinic since your last visit? Hospitalized since your last visit? No    2. Have you seen or consulted any other health care providers outside of the [address] [place] [address] since your last visit? Include any pap smears or colon screening. No         Health Maintenance reviewed - disucssed. Requested Prescriptions     Signed Prescriptions Disp Refills    pneumococcal 13 calos conj dip (PREVNAR-13) 0.5 mL syrg injection 0.5 mL 0     Si.5 mL by IntraMUSCular route once for 1 dose.        Visit Vitals    /59 (BP 1 Location: Right arm, BP Patient Position: Sitting)    Pulse 68    Temp 96.5 °F (35.8 °C) (Oral)    Resp 13    Ht 5' (1.524 m)    Wt 100 lb 12.8 oz (45.7 kg)    SpO2 98%    BMI 19.69 kg/m2          Upcoming Appts  No.     VORB:   Orders Placed This Encounter    SURI MAMMO BI SCREENING INCL CAD    METABOLIC PANEL, COMPREHENSIVE    LIPID PANEL    OCCULT BLOOD, IMMUNOASSAY (FIT)    pneumococcal 13 calos conj dip (PREVNAR-13) 0.5 mL syrg injection   MD Keara Victor LPN

## 2018-06-12 NOTE — MR AVS SNAPSHOT
[address] [place] [address] [id number] [phone] 
[phone] Patient: Wilfrid Cuellar MRN: [id number] RRP:3/34/6217 Visit Information Date & Time Provider Department Dept. Phone Encounter #  
 6/12/2018  8:30 AM Brent Thompson [facility] [phone] [id number] Follow-up Instructions Return in about 3 months (around 9/12/2018) for follow up of chronic conditions. Upcoming Health Maintenance Date Due DTaP/Tdap/Td series (1 - Tdap) 1/13/1967 BREAST CANCER SCRN MAMMOGRAM 3/10/2018 Pneumococcal 65+ Low/Medium Risk (2 of 2 - PPSV23) 4/7/2018 FOBT Q 1 YEAR AGE 50-75 7/31/2018 Influenza Age 5 to Adult 8/1/2018 GLAUCOMA SCREENING Q2Y 4/7/2019 MEDICARE YEARLY EXAM 6/13/2019 Allergies as of 6/12/2018  Review Complete On: 6/12/2018 By: Radhika Gotti LPN No Known Allergies Current Immunizations  Never Reviewed No immunizations on file. Not reviewed this visit You Were Diagnosed With   
  
 Codes Comments Hyperlipidemia, unspecified hyperlipidemia type    -  Primary ICD-10-CM: E78.5 ICD-9-CM: 272.4 Benign familial tremor     ICD-10-CM: G25.0 ICD-9-CM: 333.1 History of kidney stones     ICD-10-CM: Z87.442 ICD-9-CM: V13.01 Encounter for vaccination     ICD-10-CM: W70 ICD-9-CM: V05.9 Screening for colon cancer     ICD-10-CM: Z12.11 ICD-9-CM: V76.51 Screening mammogram, encounter for     ICD-10-CM: Z12.31 
ICD-9-CM: V76.12 Vitals BP Pulse Temp Resp Height(growth percentile) Weight(growth percentile) 111/59 (BP 1 Location: Right arm, BP Patient Position: Sitting) 68 96.5 °F (35.8 °C) (Oral) 13 5' (1.524 m) 100 lb 12.8 oz (45.7 kg) SpO2 BMI OB Status Smoking Status 98% 19.69 kg/m2 Postmenopausal Former Smoker BMI and BSA Data Body Mass Index Body Surface Area  
 19.69 kg/m 2 1.39 m 2 Preferred Pharmacy Pharmacy Name Phone [facility] [place], [address] [place] [address] [phone] Your Updated Medication List  
  
   
This list is accurate as of 18  9:09 AM.  Always use your most recent med list.  
  
  
  
  
 pneumococcal 13 calos conj dip 0.5 mL Syrg injection Commonly known as:  PREVNAR-13  
0.5 mL by IntraMUSCular route once for 1 dose. pravastatin 40 mg tablet Commonly known as:  PRAVACHOL  
TAKE 1 TABLET BY MOUTH EVERY NIGHT Prescriptions Printed Refills  
 pneumococcal 13 calos conj dip (PREVNAR-13) 0.5 mL syrg injection 0 Si.5 mL by IntraMUSCular route once for 1 dose. Class: Print Route: IntraMUSCular Follow-up Instructions Return in about 3 months (around 2018) for follow up of chronic conditions. To-Do List   
 2018 Lab:  LIPID PANEL   
  
 2018 Imaging:  SURI MAMMO BI SCREENING INCL CAD   
  
 2018 Lab:  METABOLIC PANEL, COMPREHENSIVE   
  
 2018 Lab:  OCCULT BLOOD, IMMUNOASSAY (FIT) Introducing [facility]! [facility] introduces Veenome patient portal. Now you can access parts of your medical record, email your doctor's office, and request medication refills online. 1. In your internet browser, go to [email or web address] 2. Click on the First Time User? Click Here link in the Sign In box. You will see the New Member Sign Up page. 3. Enter your Veenome Access Code exactly as it appears below. You will not need to use this code after youve completed the sign-up process. If you do not sign up before the expiration date, you must request a new code. · Veenome Access Code: [email or web address] Expires: 9/10/2018  9:09 AM 
 
4. Enter the last four digits of your Social Security Number (xxxx) and Date of Birth (mm/dd/yyyy) as indicated and click Submit. You will be taken to the next sign-up page. 5. Create a Loudie ID. This will be your Loudie login ID and cannot be changed, so think of one that is secure and easy to remember. 6. Create a Loudie password. You can change your password at any time. 7. Enter your Password Reset Question and Answer. This can be used at a later time if you forget your password. 8. Enter your e-mail address. You will receive e-mail notification when new information is available in [address] 9. Click Sign Up. You can now view and download portions of your medical record. 10. Click the Download Summary menu link to download a portable copy of your medical information. If you have questions, please visit the Frequently Asked Questions section of the Loudie website. Remember, Loudie is NOT to be used for urgent needs. For medical emergencies, dial 911. Now available from your iPhone and Android! Please provide this summary of care documentation to your next provider. Your primary care clinician is listed as Julia Alberts. If you have any questions after today's visit, please call [phone].

## 2018-06-12 NOTE — PROGRESS NOTES
Subjective:   Ion Sanchez is a 68 y.o.  female who presents for Follow-up and Insomnia    Hyperlipidemia:  Not at goal.  Pt is making efforts with healthy diet and taking pravastatin without adverse effect. 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 She previously declined referral to neurology. She reports symptoms have improved. H/o nephrolithiasis: Pt reports no symptoms of dysuria, urinary frequency, flank pain, or hematuria. Insomnia:  Pt reports difficulty falling asleep. She reports a history of noisy neighbors as factors in falling asleep. She states that once she does fall asleep she has no problems with staying asleep. She denies feelings of depression. Review of Systems   Constitutional: Negative. HENT: Negative. Eyes: Negative. Respiratory: Negative. Cardiovascular: Negative. Gastrointestinal: Negative. Genitourinary: Negative. Musculoskeletal: Negative. Skin: Negative. Neurological: Positive for tremors (improved per patient). Endo/Heme/Allergies: Negative. Psychiatric/Behavioral: The patient has insomnia. Current Outpatient Prescriptions on File Prior to Visit   Medication Sig Dispense Refill    pravastatin (PRAVACHOL) 40 mg tablet TAKE 1 TABLET BY MOUTH EVERY NIGHT 90 Tab 1     No current facility-administered medications on file prior to visit. Reviewed PmHx, RxHx, FmHx, SocHx, AllgHx and updated and dated in the chart. Nurse notes were reviewed and are correct    Objective:     Vitals:    06/12/18 0836   BP: 111/59   Pulse: 68   Resp: 13   Temp: 96.5 °F (35.8 °C)   TempSrc: Oral   SpO2: 98%   Weight: 100 lb 12.8 oz (45.7 kg)   Height: 5' (1.524 m)     Physical Exam   Constitutional: She is oriented to person, place, and time. She appears well-developed and well-nourished. HENT:   Head: Normocephalic and atraumatic.    Right Ear: External ear normal.   Left Ear: External ear normal.   Nose: Nose normal.   Mouth/Throat: Oropharynx is clear and moist.   Eyes: Conjunctivae and EOM are normal. Pupils are equal, round, and reactive to light. Neck: Normal range of motion. Neck supple. Cardiovascular: Normal rate, regular rhythm, normal heart sounds and intact distal pulses. Pulmonary/Chest: Effort normal and breath sounds normal.   Abdominal: Soft. Bowel sounds are normal.   Musculoskeletal: Normal range of motion. She exhibits no edema. Neurological: She is alert and oriented to person, place, and time. She has normal strength. She displays tremor (fine tremor of outstretched hands noted). No cranial nerve deficit or sensory deficit. Skin: Skin is warm and dry. Psychiatric: She has a normal mood and affect. Nursing note and vitals reviewed. Assessment/ Plan:     Diagnoses and all orders for this visit:    1. Hyperlipidemia, unspecified hyperlipidemia type  -     METABOLIC PANEL, COMPREHENSIVE; Future  -     LIPID PANEL; Future    2. Benign familial tremor        -     Exam shows fine tremor with outstretched hands    3. History of kidney stones        -     Stable. No recent urinary symptoms    4. Encounter for vaccination  -     pneumococcal 13 calos conj dip (PREVNAR-13) 0.5 mL syrg injection; 0.5 mL by IntraMUSCular route once for 1 dose. 5. Screening for colon cancer  -     OCCULT BLOOD, IMMUNOASSAY (FIT); Future    6. Screening mammogram, encounter for  -     [facility] MAMMO BI SCREENING INCL CAD; Future     I have discussed the diagnosis with the patient and the intended plan as seen in the above orders. The patient verbalized understanding and agrees with the plan.     Follow-up Disposition: Not on [facility], MD

## 2018-06-26 ENCOUNTER — HOSPITAL ENCOUNTER (OUTPATIENT)
Dept: LAB | Age: 73
Discharge: HOME | End: 2018-06-26
Payer: MEDICARE

## 2018-06-26 DIAGNOSIS — Z12.11 SCREENING FOR COLON CANCER: ICD-10-CM

## 2018-06-26 PROCEDURE — 82274 ASSAY TEST FOR BLOOD FECAL: CPT | Performed by: INTERNAL MEDICINE

## 2018-06-28 ENCOUNTER — HOSPITAL ENCOUNTER (OUTPATIENT)
Dept: LAB | Age: 73
Discharge: HOME | End: 2018-06-28
Payer: MEDICARE

## 2018-06-28 DIAGNOSIS — R94.4 DECREASED GFR: ICD-10-CM

## 2018-06-28 LAB
ANION GAP SERPL CALC-SCNC: 6 MMOL/L (ref 3–18)
BUN SERPL-MCNC: 11 MG/DL (ref 7–18)
BUN/CREAT SERPL: 14 (ref 12–20)
CALCIUM SERPL-MCNC: 8.9 MG/DL (ref 8.5–10.1)
CHLORIDE SERPL-SCNC: 105 MMOL/L (ref 100–108)
CO2 SERPL-SCNC: 28 MMOL/L (ref 21–32)
CREAT SERPL-MCNC: 0.8 MG/DL (ref 0.6–1.3)
GLUCOSE SERPL-MCNC: 80 MG/DL (ref 74–99)
HEMOCCULT STL QL IA: NEGATIVE
POTASSIUM SERPL-SCNC: 4.4 MMOL/L (ref 3.5–5.5)
SODIUM SERPL-SCNC: 139 MMOL/L (ref 136–145)

## 2018-06-28 PROCEDURE — 80048 BASIC METABOLIC PNL TOTAL CA: CPT | Performed by: INTERNAL MEDICINE

## 2018-06-28 PROCEDURE — 36415 COLL VENOUS BLD VENIPUNCTURE: CPT | Performed by: INTERNAL MEDICINE

## 2018-07-03 RX ORDER — PRAVASTATIN SODIUM 40 MG/1
TABLET ORAL
Qty: 90 TAB | Refills: 0 | Status: SHIPPED | OUTPATIENT
Start: 2018-07-03 | End: 2018-10-11 | Stop reason: SDUPTHER

## 2018-07-09 NOTE — PROGRESS NOTES
Called pt and left message. Call back number left and I myself or one of the other nurses will attempt to contact again. The call was to inform pt of her lab results.

## 2018-07-19 ENCOUNTER — HOSPITAL ENCOUNTER (OUTPATIENT)
Dept: MAMMOGRAPHY | Age: 73
Discharge: HOME | End: 2018-07-19
Attending: INTERNAL MEDICINE
Payer: MEDICARE

## 2018-07-19 DIAGNOSIS — Z12.31 SCREENING MAMMOGRAM, ENCOUNTER FOR: ICD-10-CM

## 2018-07-19 PROCEDURE — 77067 SCR MAMMO BI INCL CAD: CPT

## 2018-07-31 ENCOUNTER — TELEPHONE (OUTPATIENT)
Dept: FAMILY MEDICINE CLINIC | Facility: CLINIC | Age: 73
End: 2018-07-31

## 2018-07-31 NOTE — TELEPHONE ENCOUNTER
Spoke with pt in regards to lab results. Two pt identifier's and permission to release verified. Relayed 's notes. Pt acknowledges understanding and voices no concerns at this time. ----- Message from 31 Serina Marie MD sent at 7/25/2018  1:27 PM EDT -----  Mammogram shows no evidence of malignancy. Follow up for repeat in 1 year.

## 2018-08-22 ENCOUNTER — TELEPHONE (OUTPATIENT)
Dept: FAMILY MEDICINE CLINIC | Facility: CLINIC | Age: 73
End: 2018-08-22

## 2018-09-11 ENCOUNTER — OFFICE VISIT (OUTPATIENT)
Dept: FAMILY MEDICINE CLINIC | Facility: CLINIC | Age: 73
End: 2018-09-11

## 2018-09-11 ENCOUNTER — HOSPITAL ENCOUNTER (OUTPATIENT)
Dept: LAB | Age: 73
Discharge: HOME | End: 2018-09-11
Payer: MEDICARE

## 2018-09-11 VITALS
HEART RATE: 64 BPM | RESPIRATION RATE: 12 BRPM | TEMPERATURE: 97 F | DIASTOLIC BLOOD PRESSURE: 64 MMHG | SYSTOLIC BLOOD PRESSURE: 121 MMHG | OXYGEN SATURATION: 98 % | HEIGHT: 60 IN | BODY MASS INDEX: 19.52 KG/M2 | WEIGHT: 99.4 LBS

## 2018-09-11 DIAGNOSIS — Z23 ENCOUNTER FOR IMMUNIZATION: Primary | ICD-10-CM

## 2018-09-11 DIAGNOSIS — E78.5 HYPERLIPIDEMIA, UNSPECIFIED HYPERLIPIDEMIA TYPE: ICD-10-CM

## 2018-09-11 DIAGNOSIS — G25.0 BENIGN FAMILIAL TREMOR: ICD-10-CM

## 2018-09-11 LAB
CHOLEST SERPL-MCNC: 201 MG/DL
HDLC SERPL-MCNC: 35 MG/DL (ref 40–60)
HDLC SERPL: 5.7 {RATIO} (ref 0–5)
LDLC SERPL CALC-MCNC: 139.4 MG/DL (ref 0–100)
LIPID PROFILE,FLP: ABNORMAL
TRIGL SERPL-MCNC: 133 MG/DL (ref ?–150)
VLDLC SERPL CALC-MCNC: 26.6 MG/DL

## 2018-09-11 PROCEDURE — 36415 COLL VENOUS BLD VENIPUNCTURE: CPT | Performed by: INTERNAL MEDICINE

## 2018-09-11 PROCEDURE — 80061 LIPID PANEL: CPT | Performed by: INTERNAL MEDICINE

## 2018-09-11 NOTE — PATIENT INSTRUCTIONS
Vaccine Information Statement Influenza (Flu) Vaccine (Inactivated or Recombinant): What you need to know Many Vaccine Information Statements are available in Japanese and other languages. See www.immunize.org/vis Hojas de Información Sobre Vacunas están disponibles en Español y en muchos otros idiomas. Visite www.immunize.org/vis 1. Why get vaccinated? Influenza (flu) is a contagious disease that spreads around the [place] every year, usually between October and May. Flu is caused by influenza viruses, and is spread mainly by coughing, sneezing, and close contact. Anyone can get flu. Flu strikes suddenly and can last several days. Symptoms vary by age, but can include: 
 fever/chills  sore throat  muscle aches  fatigue  cough  headache  runny or stuffy nose Flu can also lead to pneumonia and blood infections, and cause diarrhea and seizures in children. If you have a medical condition, such as heart or lung disease, flu can make it worse. Flu is more dangerous for some people. Infants and young children, people 72years of age and older, pregnant women, and people with certain health conditions or a weakened immune system are at greatest risk. Each year thousands of people in the [facility] die from flu, and many more are hospitalized. Flu vaccine can: 
 keep you from getting flu, 
 make flu less severe if you do get it, and 
 keep you from spreading flu to your family and other people. 2. Inactivated and recombinant flu vaccines A dose of flu vaccine is recommended every flu season. Children 6 months through 6years of age may need two doses during the same flu season. Everyone else needs only one dose each flu season.   
 
 
Some inactivated flu vaccines contain a very small amount of a mercury-based preservative called thimerosal. Studies have not shown thimerosal in vaccines to be harmful, but flu vaccines that do not contain thimerosal are available. There is no live flu virus in flu shots. They cannot cause the flu. There are many flu viruses, and they are always changing. Each year a new flu vaccine is made to protect against three or four viruses that are likely to cause disease in the upcoming flu season. But even when the vaccine doesnt exactly match these viruses, it may still provide some protection Flu vaccine cannot prevent: 
 flu that is caused by a virus not covered by the vaccine, or 
 illnesses that look like flu but are not. It takes about 2 weeks for protection to develop after vaccination, and protection lasts through the flu season. 3. Some people should not get this vaccine Tell the person who is giving you the vaccine:  If you have any severe, life-threatening allergies. If you ever had a life-threatening allergic reaction after a dose of flu vaccine, or have a severe allergy to any part of this vaccine, you may be advised not to get vaccinated. Most, but not all, types of flu vaccine contain a small amount of egg protein.  If you ever had Guillain-Barré Syndrome (also called GBS). Some people with a history of GBS should not get this vaccine. This should be discussed with your doctor.  If you are not feeling well. It is usually okay to get flu vaccine when you have a mild illness, but you might be asked to come back when you feel better. 4. Risks of a vaccine reaction With any medicine, including vaccines, there is a chance of reactions. These are usually mild and go away on their own, but serious reactions are also possible. Most people who get a flu shot do not have any problems with it. Minor problems following a flu shot include:  
 soreness, redness, or swelling where the shot was given  hoarseness  sore, red or itchy eyes  cough  fever  aches  headache  itching  fatigue If these problems occur, they usually begin soon after the shot and last 1 or 2 days. More serious problems following a flu shot can include the following:  There may be a small increased risk of Guillain-Barré Syndrome (GBS) after inactivated flu vaccine. This risk has been estimated at 1 or 2 additional cases per million people vaccinated. This is much lower than the risk of severe complications from flu, which can be prevented by flu vaccine.  Young children who get the flu shot along with pneumococcal vaccine (PCV13) and/or DTaP vaccine at the same time might be slightly more likely to have a seizure caused by fever. Ask your doctor for more information. Tell your doctor if a child who is getting flu vaccine has ever had a seizure. Problems that could happen after any injected vaccine:  People sometimes faint after a medical procedure, including vaccination. Sitting or lying down for about 15 minutes can help prevent fainting, and injuries caused by a fall. Tell your doctor if you feel dizzy, or have vision changes or ringing in the ears.  Some people get severe pain in the shoulder and have difficulty moving the arm where a shot was given. This happens very rarely.  Any medication can cause a severe allergic reaction. Such reactions from a vaccine are very rare, estimated at about 1 in a million doses, and would happen within a few minutes to a few hours after the vaccination. As with any medicine, there is a very remote chance of a vaccine causing a serious injury or death. The safety of vaccines is always being monitored. For more information, visit: www.cdc.gov/vaccinesafety/ 
 
5. What if there is a serious reaction? What should I look for?  Look for anything that concerns you, such as signs of a severe allergic reaction, very high fever, or unusual behavior.  
 
Signs of a severe allergic reaction can include hives, swelling of the face and throat, difficulty breathing, a fast heartbeat, dizziness, and weakness  usually within a few minutes to a few hours after the vaccination. What should I do?  If you think it is a severe allergic reaction or other emergency that cant wait, call 9-1-1 and get the person to the nearest hospital. Otherwise, call your doctor.  Reactions should be reported to the Vaccine Adverse Event Reporting System (VAERS). Your doctor should file this report, or you can do it yourself through  the VAERS web site at www.vaers. [facility].gov, or by calling 4-321.530.6223. VAERS does not give medical advice. 6. The National Vaccine Injury Compensation Program 
 
The [facility] Vaccine Injury Compensation Program (VICP) is a federal program that was created to compensate people who may have been injured by certain vaccines. Persons who believe they may have been injured by a vaccine can learn about the program and about filing a claim by calling 4-172.122.4410 or visiting the [employer] website at www.[facility].gov/vaccinecompensation. There is a time limit to file a claim for compensation. 7. How can I learn more?  Ask your healthcare provider. He or she can give you the vaccine package insert or suggest other sources of information.  Call your local or state health department.  Contact the Centers for Disease Control and Prevention (CDC): 
- Call 5-605.686.5713 (1-800-CDC-INFO) or 
- Visit CDCs website at www.cdc.gov/flu Vaccine Information Statement Inactivated Influenza Vaccine 8/7/2015 
42 KASSANDRA Coburn [id number] Department of [facility] [employer] [facility] [employer] Centers for Disease Control and Prevention Office Use Only

## 2018-09-11 NOTE — PROGRESS NOTES
Subjective:  
Charlie Peters is a 68 y.o.  female who presents for Follow-up and Labs Hyperlipidemia: This is chronic. Not at goal.  Pt is making efforts with healthy diet and taking pravastatin without adverse effect. 
  
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 She reports symptoms have been occurring much less frequently. 
  
H/o nephrolithiasis: Pt reports no recent symptoms of dysuria, urinary frequency, flank pain, or hematuria. 
  
Insomnia:  Pt reports improvement with melatonin and improvement in sleep hygiene. Review of Systems Constitutional: Negative for chills and fever. Eyes: Negative. Cardiovascular: Negative for chest pain. Gastrointestinal: Negative. Genitourinary: Negative. Musculoskeletal: Negative. Neurological: Positive for tremors. Negative for dizziness, sensory change, speech change, focal weakness, seizures and headaches. Psychiatric/Behavioral: Negative. Current Outpatient Prescriptions on File Prior to Visit Medication Sig Dispense Refill  pravastatin (PRAVACHOL) 40 mg tablet TAKE 1 TABLET BY MOUTH EVERY NIGHT 90 Tab 0 No current facility-administered medications on file prior to visit. Reviewed PmHx, RxHx, FmHx, SocHx, AllgHx and updated and dated in the chart. Nurse notes were reviewed and are correct Objective:  
 
Vitals:  
 09/11/18 0415 BP: 121/64 Pulse: 64 Resp: 12 Temp: 97 °F (36.1 °C) TempSrc: Oral  
SpO2: 98% Weight: 99 lb 6.4 oz (45.1 kg) Height: 5' (1.524 m) Physical Exam  
Constitutional: She is oriented to person, place, and time. She appears well-developed and well-nourished. HENT:  
Head: Normocephalic and atraumatic. Mouth/Throat: Oropharynx is clear and moist.  
Eyes: Conjunctivae and EOM are normal. Pupils are equal, round, and reactive to light. Neck: Normal range of motion. Neck supple. Cardiovascular: Normal rate, regular rhythm, normal heart sounds and intact distal pulses. Pulmonary/Chest: Effort normal and breath sounds normal.  
Abdominal: Soft. Bowel sounds are normal.  
Musculoskeletal: Normal range of motion. She exhibits no edema. Neurological: She is alert and oriented to person, place, and time. Skin: Skin is warm and dry. Psychiatric: She has a normal mood and affect. Nursing note and vitals reviewed. Assessment/ Plan:  
 
Diagnoses and all orders for this visit: 1. Encounter for immunization -     Influenza virus vaccine (QUADRIVALENT PRES FREE SYRINGE) IM (66367) 
-     diph,Pertuss,Acell,,Tet Vac-PF (ADACEL) 2 Lf-(2.5-5-3-5 mcg)-5Lf/0.5 mL susp; 0.5 mL by IntraMUSCular route once for 1 dose. 2. Hyperlipidemia, unspecified hyperlipidemia type -     Continue statin and efforts with lifestyle modification 3. Benign familial tremor 
      -     Stable. Will continue to monitor. I have discussed the diagnosis with the patient and the intended plan as seen in the above orders. The patient verbalized understanding and agrees with the plan. Follow-up Disposition: Not on File 31 Serina Marie MD

## 2018-09-11 NOTE — PROGRESS NOTES
Stan Collado is a 68 y.o. female who presents for routine immunizations. She denies any symptoms , reactions or allergies that would exclude them from being immunized today. Risks and adverse reactions were discussed and the VIS was given to them. All questions were addressed. She was observed [id number] post injection. There were no reactions observed. Tequila Sharpe LPN Stan Collado is a 68 y. o.@ presents today for office visit for follow up. Pt is in Room # 5. 
 
 1. Have you been to the ER, urgent care clinic since your last visit? Hospitalized since your last visit? No 
 
2. Have you seen or consulted any other health care providers outside of the [address] [place] [address] since your last visit? Include any pap smears or colon screening. No 
 
  
 
Health Maintenance reviewed - flu shot given, dtap prescription given, pneumonia prescription given Requested Prescriptions Pending Prescriptions Disp Refills  diph,Pertuss,Acell,,Tet Vac-PF (ADACEL) 2 Lf-(2.5-5-3-5 mcg)-5Lf/0.5 mL susp 1 Syringe 0 Si.5 mL by IntraMUSCular route once for 1 dose. Visit Vitals  /64 (BP 1 Location: Right arm, BP Patient Position: Sitting)  Pulse 64  Temp 97 °F (36.1 °C) (Oral)  Resp 12  Ht 5' (1.524 m)  Wt 99 lb 6.4 oz (45.1 kg)  SpO2 98%  BMI 19.41 kg/m2  
 
 
  
Upcoming Appts No. 
  
VORB:  
Orders Placed This Encounter  Influenza virus vaccine (QUADRIVALENT PRES FREE SYRINGE) IM ([id number])  MD Tequila Marin LPN

## 2018-09-11 NOTE — MR AVS SNAPSHOT
[address] [address] 
[phone] Patient: Lesly Meade MRN: [id number] EAY:[id number][id number] Visit Information Date & Time Provider Department Dept. Phone Encounter #  
 9/11/2018  8:30 AM Manfred Severs,  Gilles Stover [phone] [id number] Follow-up Instructions Return in about 6 months (around 3/11/2019) for HLD. Upcoming Health Maintenance Date Due DTaP/Tdap/Td series (1 - Tdap) 1/13/1967 Pneumococcal 65+ Low/Medium Risk (2 of 2 - PPSV23) 4/7/2018 Influenza Age 5 to Adult 8/1/2018 GLAUCOMA SCREENING Q2Y 4/7/2019 MEDICARE YEARLY EXAM 6/13/2019 FOBT Q 1 YEAR AGE 50-75 6/26/2019 BREAST CANCER SCRN MAMMOGRAM 7/19/2020 Allergies as of 9/11/2018  Review Complete On: 9/11/2018 By: Manfred Severs, MD  
 No Known Allergies Current Immunizations  Reviewed on 9/11/2018 Name Date Influenza Vaccine (Quad) PF 9/11/2018 Reviewed by Adelita Reyes LPN on 5/28/3895 at  8:52 AM  
 Reviewed by Adelita Reyes LPN on 9/05/4571 at  8:52 AM  
You Were Diagnosed With   
  
 Codes Comments Encounter for immunization    -  Primary ICD-10-CM: G69 ICD-9-CM: V03.89 Hyperlipidemia, unspecified hyperlipidemia type     ICD-10-CM: E78.5 ICD-9-CM: 272.4 Benign familial tremor     ICD-10-CM: G25.0 ICD-9-CM: 333.1 Vitals BP Pulse Temp Resp Height(growth percentile) Weight(growth percentile) 121/64 (BP 1 Location: Right arm, BP Patient Position: Sitting) 64 97 °F (36.1 °C) (Oral) 12 5' (1.524 m) 99 lb 6.4 oz (45.1 kg) SpO2 BMI OB Status Smoking Status 98% 19.41 kg/m2 Postmenopausal Former Smoker BMI and BSA Data Body Mass Index Body Surface Area  
 19.41 kg/m 2 1.38 m 2 Preferred Pharmacy Pharmacy Name Phone  [address] [place] [address] AT [facility] [address] [facility] [phone] Your Updated Medication List  
  
   
This list is accurate as of 18  9:14 AM.  Always use your most recent med list.  
  
  
  
  
 diph,Pertuss(Acell),Tet Vac-PF 2 Lf-(2.5-5-3-5 mcg)-5Lf/0.5 mL susp Commonly known as:  ADACEL  
0.5 mL by IntraMUSCular route once for 1 dose. pneumococcal 13 calos conj dip 0.5 mL Syrg injection Commonly known as:  PREVNAR-13  
0.5 mL by IntraMUSCular route once for 1 dose. Indications: PREVENTION OF STREPTOCOCCUS PNEUMONIAE INFECTION  
  
 pravastatin 40 mg tablet Commonly known as:  PRAVACHOL  
TAKE 1 TABLET BY MOUTH EVERY NIGHT Prescriptions Printed Refills diph,Pertuss,Acell,,Tet Vac-PF (ADACEL) 2 Lf-(2.5-5-3-5 mcg)-5Lf/0.5 mL susp 0 Si.5 mL by IntraMUSCular route once for 1 dose. Class: Print Route: IntraMUSCular  
 pneumococcal 13 calos conj dip (PREVNAR-13) 0.5 mL syrg injection 0 Si.5 mL by IntraMUSCular route once for 1 dose. Indications: PREVENTION OF STREPTOCOCCUS PNEUMONIAE INFECTION Class: Print Route: IntraMUSCular We Performed the Following INFLUENZA VIRUS VAC QUAD,SPLIT,PRESV FREE SYRINGE IM [id number] CPT(R)] Follow-up Instructions Return in about 6 months (around 3/11/2019) for HLD. Patient Instructions Vaccine Information Statement Influenza (Flu) Vaccine (Inactivated or Recombinant): What you need to know Many Vaccine Information Statements are available in Sami and other languages. See www.immunize.org/vis Hojas de Información Sobre Vacunas están disponibles en Español y en muchos otros idiomas. Visite www.immunize.org/vis 1. Why get vaccinated? Influenza (flu) is a contagious disease that spreads around the [place] every year, usually between October and May. Flu is caused by influenza viruses, and is spread mainly by coughing, sneezing, and close contact. Anyone can get flu. Flu strikes suddenly and can last several days. Symptoms vary by age, but can include: 
 fever/chills  sore throat  muscle aches  fatigue  cough  headache  runny or stuffy nose Flu can also lead to pneumonia and blood infections, and cause diarrhea and seizures in children. If you have a medical condition, such as heart or lung disease, flu can make it worse. Flu is more dangerous for some people. Infants and young children, people 72years of age and older, pregnant women, and people with certain health conditions or a weakened immune system are at greatest risk. Each year thousands of people in the [facility] die from flu, and many more are hospitalized. Flu vaccine can: 
 keep you from getting flu, 
 make flu less severe if you do get it, and 
 keep you from spreading flu to your family and other people. 2. Inactivated and recombinant flu vaccines A dose of flu vaccine is recommended every flu season. Children 6 months through 6years of age may need two doses during the same flu season. Everyone else needs only one dose each flu season. Some inactivated flu vaccines contain a very small amount of a mercury-based preservative called thimerosal. Studies have not shown thimerosal in vaccines to be harmful, but flu vaccines that do not contain thimerosal are available. There is no live flu virus in flu shots. They cannot cause the flu. There are many flu viruses, and they are always changing. Each year a new flu vaccine is made to protect against three or four viruses that are likely to cause disease in the upcoming flu season. But even when the vaccine doesnt exactly match these viruses, it may still provide some protection Flu vaccine cannot prevent: 
 flu that is caused by a virus not covered by the vaccine, or 
 illnesses that look like flu but are not.  
 
It takes about 2 weeks for protection to develop after vaccination, and protection lasts through the flu season. 3. Some people should not get this vaccine Tell the person who is giving you the vaccine:  If you have any severe, life-threatening allergies. If you ever had a life-threatening allergic reaction after a dose of flu vaccine, or have a severe allergy to any part of this vaccine, you may be advised not to get vaccinated. Most, but not all, types of flu vaccine contain a small amount of egg protein.  If you ever had Guillain-Barré Syndrome (also called GBS). Some people with a history of GBS should not get this vaccine. This should be discussed with your doctor.  If you are not feeling well. It is usually okay to get flu vaccine when you have a mild illness, but you might be asked to come back when you feel better. 4. Risks of a vaccine reaction With any medicine, including vaccines, there is a chance of reactions. These are usually mild and go away on their own, but serious reactions are also possible. Most people who get a flu shot do not have any problems with it. Minor problems following a flu shot include:  
 soreness, redness, or swelling where the shot was given  hoarseness  sore, red or itchy eyes  cough  fever  aches  headache  itching  fatigue If these problems occur, they usually begin soon after the shot and last 1 or 2 days. More serious problems following a flu shot can include the following:  There may be a small increased risk of Guillain-Barré Syndrome (GBS) after inactivated flu vaccine. This risk has been estimated at 1 or 2 additional cases per million people vaccinated. This is much lower than the risk of severe complications from flu, which can be prevented by flu vaccine.    
 
 Young children who get the flu shot along with pneumococcal vaccine (PCV13) and/or DTaP vaccine at the same time might be slightly more likely to have a seizure caused by fever. Ask your doctor for more information. Tell your doctor if a child who is getting flu vaccine has ever had a seizure. Problems that could happen after any injected vaccine:  People sometimes faint after a medical procedure, including vaccination. Sitting or lying down for about 15 minutes can help prevent fainting, and injuries caused by a fall. Tell your doctor if you feel dizzy, or have vision changes or ringing in the ears.  Some people get severe pain in the shoulder and have difficulty moving the arm where a shot was given. This happens very rarely.  Any medication can cause a severe allergic reaction. Such reactions from a vaccine are very rare, estimated at about 1 in a million doses, and would happen within a few minutes to a few hours after the vaccination. As with any medicine, there is a very remote chance of a vaccine causing a serious injury or death. The safety of vaccines is always being monitored. For more information, visit: www.cdc.gov/vaccinesafety/ 
 
5. What if there is a serious reaction? What should I look for?  Look for anything that concerns you, such as signs of a severe allergic reaction, very high fever, or unusual behavior. Signs of a severe allergic reaction can include hives, swelling of the face and throat, difficulty breathing, a fast heartbeat, dizziness, and weakness  usually within a few minutes to a few hours after the vaccination. What should I do?  If you think it is a severe allergic reaction or other emergency that cant wait, call 9-1-1 and get the person to the nearest hospital. Otherwise, call your doctor.  Reactions should be reported to the Vaccine Adverse Event Reporting System (VAERS). Your doctor should file this report, or you can do it yourself through  the VAERS web site at www.vaers. [facility].gov, or by calling 1-720.707.1003. VAERS does not give medical advice. 6. The National Vaccine Injury Compensation Program 
 
The [facility] Vaccine Injury Compensation Program (VICP) is a federal program that was created to compensate people who may have been injured by certain vaccines. Persons who believe they may have been injured by a vaccine can learn about the program and about filing a claim by calling 4-135.460.2829 or visiting the [address] website at www.[facility].gov/vaccinecompensation. There is a time limit to file a claim for compensation. 7. How can I learn more?  Ask your healthcare provider. He or she can give you the vaccine package insert or suggest other sources of information.  Call your local or state health department.  Contact the Centers for Disease Control and Prevention (CDC): 
- Call 2-420.775.2849 (0-532-MDK-INFO) or 
- Visit CDCs website at www.cdc.gov/flu Vaccine Information Statement Inactivated Influenza Vaccine 8/7/2015 
42 KASSANDRA Herrera [id number] [facility] of [facility] and [employer] Centers for Disease Control and Prevention Office Use Only Introducing [facility]! Alex Castroprosper introduces [employer] patient portal. Now you can access parts of your medical record, email your doctor's office, and request medication refills online. 1. In your internet browser, go to https://[employer]. INPHI/[employer] 2. Click on the First Time User? Click Here link in the Sign In box. You will see the New Member Sign Up page. 3. Enter your Silverpop Access Code exactly as it appears below. You will not need to use this code after youve completed the sign-up process. If you do not sign up before the expiration date, you must request a new code. · [employer] Access Code: [email or web address] 
Expires: 12/10/2018  8:39 AM 
 
4. Enter the last four digits of your Social Security Number (xxxx) and Date of Birth (mm/dd/yyyy) as indicated and click Submit. You will be taken to the next sign-up page. 5. Create a Connect HQ ID. This will be your Connect HQ login ID and cannot be changed, so think of one that is secure and easy to remember. 6. Create a Connect HQ password. You can change your password at any time. 7. Enter your Password Reset Question and Answer. This can be used at a later time if you forget your password. 8. Enter your e-mail address. You will receive e-mail notification when new information is available in [address] 9. Click Sign Up. You can now view and download portions of your medical record. 10. Click the Download Summary menu link to download a portable copy of your medical information. If you have questions, please visit the Frequently Asked Questions section of the Connect HQ website. Remember, Connect HQ is NOT to be used for urgent needs. For medical emergencies, dial 911. Now available from your iPhone and Android! Please provide this summary of care documentation to your next provider. Your primary care clinician is listed as Viktor Marie If you have any questions after today's visit, please call [phone].

## 2018-09-20 ENCOUNTER — TELEPHONE (OUTPATIENT)
Dept: FAMILY MEDICINE CLINIC | Facility: CLINIC | Age: 73
End: 2018-09-20

## 2018-09-20 NOTE — TELEPHONE ENCOUNTER
Called pt and left message. Call back number left and I myself or one of the other nurses will attempt to contact again. The call was to inform pt of lab results and to schedule a follow up appt to return in about 6 months (around 3/11/2019) for HLD.     ----- Message from 31 Serina Marie MD sent at 9/20/2018  9:51 AM EDT -----  Total cholesterol and LDL have decreased slightly. HDL has increased slighty. Continue statin and efforts with healthy diet and exercise.

## 2018-09-20 NOTE — PROGRESS NOTES
Total cholesterol and LDL have decreased slightly. HDL has increased slighty. Continue statin and efforts with healthy diet and exercise.

## 2018-09-21 ENCOUNTER — TELEPHONE (OUTPATIENT)
Dept: FAMILY MEDICINE CLINIC | Facility: CLINIC | Age: 73
End: 2018-09-21

## 2018-09-21 NOTE — TELEPHONE ENCOUNTER
Spoke with pt in regards to lab results Pt is encouraged to continue medication healthy diet and exercise. Pt acknowledges understanding and voices no concerns at this time.

## 2019-06-04 ENCOUNTER — OFFICE VISIT (OUTPATIENT)
Dept: FAMILY MEDICINE CLINIC | Facility: CLINIC | Age: 74
End: 2019-06-04

## 2019-06-04 VITALS
DIASTOLIC BLOOD PRESSURE: 60 MMHG | HEIGHT: 60 IN | HEART RATE: 72 BPM | BODY MASS INDEX: 18.57 KG/M2 | RESPIRATION RATE: 14 BRPM | OXYGEN SATURATION: 97 % | WEIGHT: 94.6 LBS | TEMPERATURE: 97.6 F | SYSTOLIC BLOOD PRESSURE: 120 MMHG

## 2019-06-04 DIAGNOSIS — Z00.00 MEDICARE ANNUAL WELLNESS VISIT, SUBSEQUENT: Primary | ICD-10-CM

## 2019-06-04 DIAGNOSIS — Z13.31 SCREENING FOR DEPRESSION: ICD-10-CM

## 2019-06-04 DIAGNOSIS — F51.01 PRIMARY INSOMNIA: ICD-10-CM

## 2019-06-04 DIAGNOSIS — G25.0 BENIGN FAMILIAL TREMOR: ICD-10-CM

## 2019-06-04 DIAGNOSIS — E78.00 PURE HYPERCHOLESTEROLEMIA: ICD-10-CM

## 2019-06-04 DIAGNOSIS — R73.9 ELEVATED BLOOD SUGAR: ICD-10-CM

## 2019-06-04 DIAGNOSIS — Z71.89 ADVANCED CARE PLANNING/COUNSELING DISCUSSION: ICD-10-CM

## 2019-06-04 PROBLEM — N20.0 RENAL CALCULI: Status: RESOLVED | Noted: 2017-01-04 | Resolved: 2019-06-04

## 2019-06-04 PROBLEM — B02.29 POSTHERPETIC NEURALGIA: Status: RESOLVED | Noted: 2017-01-04 | Resolved: 2019-06-04

## 2019-06-04 RX ORDER — PRAVASTATIN SODIUM 40 MG/1
TABLET ORAL
Qty: 90 TAB | Refills: 1 | Status: SHIPPED | OUTPATIENT
Start: 2019-06-04 | End: 2019-12-13 | Stop reason: SDUPTHER

## 2019-06-04 RX ORDER — PROPRANOLOL HYDROCHLORIDE 20 MG/1
20 TABLET ORAL
Qty: 60 TAB | Refills: 3 | Status: SHIPPED | OUTPATIENT
Start: 2019-06-04 | End: 2020-02-24

## 2019-06-04 NOTE — ACP (ADVANCE CARE PLANNING)
Pt would like to appoint her son, Jose Armando Alarcon, as her medical decision maker in the event that she can no longer do so. Phone number is [phone]. Her secondary person is her son, Kandice West. Phone number is [phone]. If her death is imminent and medical treatment will not help her recover, pt does not want life prolonging measures. If her condition makes her unaware of herself or her surroundings and she cannot interact with others, pt does not want life prolonging measures. Advance directive scanned in the chart under media.       Time started: 12:55 am  Time ended: 1:11 pm

## 2019-06-04 NOTE — PROGRESS NOTES
Internal Medicine Progress Note    Today's Date:  2019   Patient:  Eileen Steel  Patient :  1945    Subjective:     Chief Complaint   Patient presents with    Annual Wellness Visit    Cholesterol Problem    Insomnia      Hyperlipidemia:  This is chronic. Not at goal.  Pt is making efforts with healthy diet and taking pravastatin without adverse effect. Lab Results   Component Value Date/Time    Cholesterol, total 201 (H) 2018 09:20 AM    HDL Cholesterol 35 (L) 2018 09:20 AM    LDL, calculated 139.4 (H) 2018 09:20 AM    VLDL, calculated 26.6 2018 09:20 AM    Triglyceride 133 2018 09:20 AM    CHOL/HDL Ratio 5.7 (H) 2018 09:20 AM      Benign familial tremor  This is a chronic problem, new to me. This is stable. Pt takes no medication for this. She is requesting a medicine to help with tremor. She does not want to take anything habit forming.      Insomnia  This is a chronic problem, new to me. This is stable. Pt reports improvement with tylenol PM. Melatonin was not effective. 3 most recent PHQ Screens 2019   Little interest or pleasure in doing things Not at all   Feeling down, depressed, irritable, or hopeless Not at all   Total Score PHQ 2 0      Past Medical History:   Diagnosis Date    Calculus of kidney     H/O bronchitis     Pure hypercholesterolemia 2019    Shingles      History reviewed. No pertinent surgical history. reports that she quit smoking about 4 years ago. She has a 10.00 pack-year smoking history. She has never used smokeless tobacco. She reports that she does not drink alcohol or use drugs.   Family History   Problem Relation Age of Onset    Tremors Mother     Cancer Mother         stomach    No Known Problems Father     Emphysema Maternal Aunt     No Known Problems Maternal Uncle     No Known Problems Maternal Grandmother     Tremors Maternal Grandfather     Hypertension Son     Hypertension Son      No Known Allergies    Review of Systems   CV:      chest pain, palpitations  PULM:  SOB, wheezing, cough, sputum production    Current Outpatient Meds and Allergies     No current outpatient medications on file prior to visit. No current facility-administered medications on file prior to visit. No Known Allergies     Objective:       Visit Vitals  /60 (BP 1 Location: Left arm, BP Patient Position: Sitting)   Pulse 72   Temp 97.6 °F (36.4 °C) (Oral)   Resp 14   Ht 5' (1.524 m)   Wt 94 lb 9.6 oz (42.9 kg)   SpO2 97%   BMI 18.48 kg/m²     General:   Well-nourished, well-groomed, pleasant, alert, in no acute distress  Head:  Normocephalic, atraumatic  Ears:  External ears WNL  Nose:  External nares WNL  Psych:  No pressured speech, no abnormal thought content    Lab Results   Component Value Date/Time    Glucose 80 06/28/2018 08:53 AM    LDL, calculated 139.4 (H) 09/11/2018 09:20 AM    Creatinine 0.80 06/28/2018 08:53 AM       Assessment/Plan & Orders:         ICD-10-CM ICD-9-CM    1. Medicare annual wellness visit, subsequent Z00.00 V70.0    2. Benign familial tremor G25.0 333.1    3. Pure hypercholesterolemia E78.00 272.0 pravastatin (PRAVACHOL) 40 mg tablet      METABOLIC PANEL, BASIC      LIPID PANEL   4. Primary insomnia F51.01 307.42    5. Elevated blood sugar R73.9 790.29 HEMOGLOBIN A1C WITH EAG   6. Screening for depression Z13.31 V79.0 UT DEPRESSION SCREEN ANNUAL   7. Advanced care planning/counseling discussion Z71.89 V65.49 ADVANCE CARE PLANNING FIRST 30 MINS     Sleep hygiene handout given  Monitor BP on new medication    Follow-up and Dispositions    · Return in about 3 months (around 9/4/2019) for insomnia, Go over lab/imaging results. *Patient verbalized understanding and agreement with the plan. Patient was given an after-visit summary. Lion Wilson MD - Internal Medicine  6/4/2019, 12:57 PM  [facility]  [address] [place], [address]  Phone [phone] [facility]  Fax [phone]

## 2019-06-04 NOTE — PROGRESS NOTES
Frances Rankin is a 69 y.o. female and presents for annual Medicare Wellness Visit. Problem List: Reviewed with patient and discussed risk factors. Patient Active Problem List   Diagnosis Code    Benign familial tremor G25.0    Pure hypercholesterolemia E78.00       Current medical providers:  Patient Care Team:  Edison Ramsay MD as PCP - General (Internal Medicine)  Dom García MD as Physician (Urology)    PSH: Reviewed with patient  History reviewed. No pertinent surgical history. SH: Reviewed with patient  Social History     Tobacco Use    Smoking status: Former Smoker     Packs/day: 0.50     Years: 20.00     Pack years: 10.00     Last attempt to quit: 2015     Years since quittin.0    Smokeless tobacco: Never Used   Substance Use Topics    Alcohol use: No    Drug use: No       FH: Reviewed with patient  Family History   Problem Relation Age of Onset    Tremors Mother     Cancer Mother         stomach    No Known Problems Father     Emphysema Maternal Aunt     No Known Problems Maternal Uncle     No Known Problems Maternal Grandmother     Tremors Maternal Grandfather     Hypertension Son     Hypertension Son        Medications/Allergies: Reviewed with patient  No current outpatient medications on file prior to visit. No current facility-administered medications on file prior to visit. No Known Allergies    Objective:  Visit Vitals  /60 (BP 1 Location: Left arm, BP Patient Position: Sitting)   Pulse 72   Temp 97.6 °F (36.4 °C) (Oral)   Resp 14   Ht 5' (1.524 m)   Wt 94 lb 9.6 oz (42.9 kg)   SpO2 97%   BMI 18.48 kg/m²    Body mass index is 18.48 kg/m².     Assessment of cognitive impairment: Alert and oriented x 3  Depression Screen:   3 most recent PHQ Screens 2019   Little interest or pleasure in doing things Not at all   Feeling down, depressed, irritable, or hopeless Not at all   Total Score PHQ 2 0     Fall Risk Assessment:    Fall Risk Assessment, last 12 mths 6/4/2019   Able to walk? Yes   Fall in past 12 months? No   Fall with injury? -   Number of falls in past 12 months -   Fall Risk Score -     Functional Ability:   Does the patient exhibit a steady gait? yes   How long did it take the patient to get up and walk from a sitting position? 2 seconds   Is the patient self reliant?  (ie can do own laundry, meals, household chores)  Does most of it since she has tremors   Does the patient handle his/her own medications? yes   Does the patient handle his/her own money? yes   Is the patients home safe (ie good lighting, handrails on stairs and bath, etc.)? yes   Did you notice or did patient express any hearing difficulties? no   Did you notice of did patient express any vision difficulties?   no   Were distance and reading eye charts used? yes     Advance Care Planning:   Patient was offered the opportunity to discuss advance care planning:  yes   Does patient have an Advance Directive:  No. Pt would like to appoint her son, Aparna Beth, as her medical decision maker in the event that she can no longer do so. Phone number is [phone]. Her secondary person is her son, Zahra Damon. Phone number is [phone]. If her death is imminent and medical treatment will not help her recover, pt does not want life prolonging measures. If her condition makes her unaware of herself or her surroundings and she cannot interact with others, pt does not want life prolonging measures. Advance directive scanned in the chart under media.       Time started: 12:55 am  Time ended: 1:11 pm   If no, did you provide information on Caring Connections?  no     Plan:    Orders Placed This Encounter    ADVANCE CARE PLANNING FIRST 30 MINS    METABOLIC PANEL, BASIC    HEMOGLOBIN A1C WITH EAG    LIPID PANEL    [place] DEPRESSION SCREEN ANNUAL    pravastatin (PRAVACHOL) 40 mg tablet    propranolol (INDERAL) 20 mg tablet     Health Maintenance   Topic Date Due    GLAUCOMA SCREENING Q2Y  2019    MEDICARE YEARLY EXAM  2019    FOBT Q 1 YEAR AGE 50-75  2019    Shingrix Vaccine Age 50> (1 of 2) 2019 (Originally 1996)    DTaP/Tdap/Td series (1 - Tdap) 2020 (Originally 1967)    Pneumococcal 65+ years (1 of 2 - PCV13) 2020 (Originally 2011)    Influenza Age 5 to Adult  2019    BREAST CANCER SCRN MAMMOGRAM  2020    Hepatitis C Screening  Completed    Bone Densitometry (Dexa) Screening  Completed     Time spent counseling pt on advanced care plannin minutes    *Patient verbalized understanding and agreement with the plan. A copy of the After Visit Summary with personalized health plan was given to the patient today. Follow-up and Dispositions    · Return in about 3 months (around 2019) for insomnia, Go over lab/imaging results. Arturo Alford.  Kaya Clarke MD - Internal Medicine  2019, 1:05 PM  [facility]  [address] [address] [place], [address]  Phone [phone]  Fax [phone]

## 2019-06-04 NOTE — PATIENT INSTRUCTIONS
Schedule of Personalized Health Plan  (Provide Copy to Patient)  The best way to stay healthy is to live a healthy lifestyle. A healthy lifestyle includes regular exercise, eating a well-balanced diet, keeping a healthy weight and not smoking. Regular physical exams and screening tests are another important way to take care of yourself. Preventive exams provided by health care providers can find health problems early when treatment works best and can keep you from getting certain diseases or illnesses. Preventive services include exams, lab tests, screenings, shots, monitoring and information to help you take care of your own health. All people over 65 should have a pneumonia shot. Pneumonia shots are usually only needed once in a lifetime unless your doctor decides differently. All people over 65 should have a yearly flu shot. People over 65 are at medium to high risk for Hepatitis B. Three shots are needed for complete protection. In addition to your physical exam, some screening tests are recommended:    Bone mass measurement (dexa scan) is recommended every two years  Diabetes Mellitus screening is recommended every year. Glaucoma is an eye disease caused by high pressure in the eye. An eye exam is recommended every year. Cardiovascular screening tests that check your cholesterol and other blood fat (lipid) levels are recommended every five years. Colorectal Cancer screening tests help to find pre-cancerous polyps (growths in the colon) so they can be removed before they turn into cancer. Tests ordered for screening depend on your personal and family history risk factors.     Screening for Breast Cancer is recommended yearly with a mammogram.    Screening for Cervical Cancer is recommended every two years (annually for certain risk factors, such as previous history of STD or abnormal PAP in past 7 years), with a Pelvic Exam with PAP    Here is a list of your current Health Maintenance items with a due date:  Health Maintenance   Topic Date Due    GLAUCOMA SCREENING Q2Y  04/07/2019    MEDICARE YEARLY EXAM  06/13/2019    FOBT Q 1 YEAR AGE 50-75  06/26/2019    Shingrix Vaccine Age 50> (1 of 2) 09/09/2019 (Originally 1/13/1996)    DTaP/Tdap/Td series (1 - Tdap) 06/04/2020 (Originally 1/13/1967)    Pneumococcal 65+ years (1 of 2 - PCV13) 06/04/2020 (Originally 1/13/2011)    Influenza Age 5 to Adult  08/01/2019    BREAST CANCER SCRN MAMMOGRAM  07/19/2020    Hepatitis C Screening  Completed    Bone Densitometry (Dexa) Screening  Completed

## 2019-06-04 NOTE — PROGRESS NOTES
Brayden Gregory is a 69 y.o.  female presents today for office visit for follow up. Pt would also like to discuss medicare wellness. Pt is not fasting. Pt is in Room # 3      1. Have you been to the ER, urgent care clinic since your last visit? Hospitalized since your last visit? No    2. Have you seen or consulted any other health care providers outside of the [address] [place] [address] since your last visit? Include any pap smears or colon screening. No    Upcoming Appts  none    Health Maintenance reviewed    VORB: No orders of the defined types were placed in this encounter.   Thora Baumgarten, [facility] LPN      Visual Acuity Screening    Right eye Left eye Both eyes   Without correction:      With correction: 20/70 20/50 20/50

## 2019-09-05 ENCOUNTER — OFFICE VISIT (OUTPATIENT)
Dept: FAMILY MEDICINE CLINIC | Facility: CLINIC | Age: 74
End: 2019-09-05

## 2019-09-05 ENCOUNTER — HOSPITAL ENCOUNTER (OUTPATIENT)
Dept: LAB | Age: 74
Discharge: HOME | End: 2019-09-05
Payer: MEDICARE

## 2019-09-05 VITALS
OXYGEN SATURATION: 95 % | HEART RATE: 63 BPM | SYSTOLIC BLOOD PRESSURE: 110 MMHG | BODY MASS INDEX: 18.93 KG/M2 | DIASTOLIC BLOOD PRESSURE: 70 MMHG | TEMPERATURE: 97.9 F | RESPIRATION RATE: 15 BRPM | HEIGHT: 60 IN | WEIGHT: 96.4 LBS

## 2019-09-05 DIAGNOSIS — E55.9 VITAMIN D DEFICIENCY: ICD-10-CM

## 2019-09-05 DIAGNOSIS — F51.01 PRIMARY INSOMNIA: ICD-10-CM

## 2019-09-05 DIAGNOSIS — G25.0 BENIGN FAMILIAL TREMOR: ICD-10-CM

## 2019-09-05 DIAGNOSIS — Z12.11 SCREEN FOR COLON CANCER: ICD-10-CM

## 2019-09-05 DIAGNOSIS — E78.00 PURE HYPERCHOLESTEROLEMIA: Primary | ICD-10-CM

## 2019-09-05 DIAGNOSIS — R73.9 ELEVATED BLOOD SUGAR: ICD-10-CM

## 2019-09-05 DIAGNOSIS — E78.00 PURE HYPERCHOLESTEROLEMIA: ICD-10-CM

## 2019-09-05 PROBLEM — G47.00 INSOMNIA: Status: ACTIVE | Noted: 2019-09-05

## 2019-09-05 LAB
ALBUMIN SERPL-MCNC: 4.1 G/DL (ref 3.4–5)
ALBUMIN/GLOB SERPL: 1.2 {RATIO} (ref 0.8–1.7)
ALP SERPL-CCNC: 97 U/L (ref 45–117)
ALT SERPL-CCNC: 15 U/L (ref 13–56)
ANION GAP SERPL CALC-SCNC: 3 MMOL/L (ref 3–18)
AST SERPL-CCNC: 17 U/L (ref 10–38)
BILIRUB SERPL-MCNC: 0.5 MG/DL (ref 0.2–1)
BUN SERPL-MCNC: 13 MG/DL (ref 7–18)
BUN/CREAT SERPL: 14 (ref 12–20)
CALCIUM SERPL-MCNC: 9.7 MG/DL (ref 8.5–10.1)
CHLORIDE SERPL-SCNC: 103 MMOL/L (ref 100–111)
CO2 SERPL-SCNC: 32 MMOL/L (ref 21–32)
CREAT SERPL-MCNC: 0.92 MG/DL (ref 0.6–1.3)
EST. AVERAGE GLUCOSE BLD GHB EST-MCNC: 114 MG/DL
GLOBULIN SER CALC-MCNC: 3.5 G/DL (ref 2–4)
GLUCOSE SERPL-MCNC: 78 MG/DL (ref 74–99)
HBA1C MFR BLD: 5.6 % (ref 4.2–5.6)
POTASSIUM SERPL-SCNC: 4.4 MMOL/L (ref 3.5–5.5)
PROT SERPL-MCNC: 7.6 G/DL (ref 6.4–8.2)
SODIUM SERPL-SCNC: 138 MMOL/L (ref 136–145)

## 2019-09-05 PROCEDURE — 83036 HEMOGLOBIN GLYCOSYLATED A1C: CPT

## 2019-09-05 PROCEDURE — 80053 COMPREHEN METABOLIC PANEL: CPT

## 2019-09-05 PROCEDURE — 80061 LIPID PANEL: CPT

## 2019-09-05 PROCEDURE — 82306 VITAMIN D 25 HYDROXY: CPT

## 2019-09-05 NOTE — PATIENT INSTRUCTIONS
Learning About High Cholesterol  What is high cholesterol? Cholesterol is a type of fat in your blood. It is needed for many body functions, such as making new cells. Cholesterol is made by your body. It also comes from food you eat. If you have too much cholesterol, it starts to build up in your arteries. This is called hardening of the arteries, or atherosclerosis. High cholesterol raises your risk of a heart attack and stroke. There are different types of cholesterol. LDL is the \"bad\" cholesterol. High LDL can raise your risk for heart disease, heart attack, and stroke. HDL is the \"good\" cholesterol. High HDL is linked with a lower risk for heart disease, heart attack, and stroke. Your cholesterol levels help your doctor find out your risk for having a heart attack or stroke. How can you prevent high cholesterol? A heart-healthy lifestyle can help you prevent high cholesterol. This lifestyle helps lower your risk for a heart attack and stroke. · Eat heart-healthy foods. ? Eat fruits, vegetables, whole grains (like oatmeal), dried beans and peas, nuts and seeds, soy products (like tofu), and fat-free or low-fat dairy products. ? Replace butter, margarine, and hydrogenated or partially hydrogenated oils with olive and canola oils. (Canola oil margarine without trans fat is fine.)  ? Replace red meat with fish, poultry, and soy protein (like tofu). ? Limit processed and packaged foods like chips, crackers, and cookies. · Be active. Exercise can improve your cholesterol level. Get at least 30 minutes of exercise on most days of the week. Walking is a good choice. You also may want to do other activities, such as running, swimming, cycling, or playing tennis or team sports. · Stay at a healthy weight. Lose weight if you need to. · Don't smoke. If you need help quitting, talk to your doctor about stop-smoking programs and medicines. These can increase your chances of quitting for good.   How is high cholesterol treated? The goal of treatment is to reduce your chances of having a heart attack or stroke. The goal is not to lower your cholesterol numbers only. · You may make lifestyle changes, such as eating healthy foods, not smoking, losing weight, and being more active. · You may have to take medicine. Follow-up care is a key part of your treatment and safety. Be sure to make and go to all appointments, and call your doctor if you are having problems. It's also a good idea to know your test results and keep a list of the medicines you take. Where can you learn more? Go to http://zia-jono.info/. Enter B715 in the search box to learn more about \"Learning About High Cholesterol. \"  Current as of: July 22, 2018  Content Version: 12.1  © 3433-8482 Healthwise, Incorporated. Care instructions adapted under license by [employer] (which disclaims liability or warranty for this information). If you have questions about a medical condition or this instruction, always ask your healthcare professional. [address] any warranty or liability for your use of this information.

## 2019-09-05 NOTE — PROGRESS NOTES
Joann Restrepo is a 69 y.o.  female presents today for office visit for follow up. Pt would also like to discuss sleep problem. Pt is not fasting. Pt is in Room # 3      1. Have you been to the ER, urgent care clinic since your last visit? Hospitalized since your last visit? No    2. Have you seen or consulted any other health care providers outside of the [address] [place] [address] since your last visit? Include any pap smears or colon screening. No    Upcoming Appts  none    Health Maintenance reviewed       VORB: No orders of the defined types were placed in this encounter.   Veronica Michelle LPN

## 2019-09-05 NOTE — PROGRESS NOTES
Internal Medicine Progress Note    Today's Date:  2019   Patient:  Shea Silveira  Patient :  1945    Subjective:     Chief Complaint   Patient presents with    Cholesterol Problem    Insomnia    Tremors      Hyperlipidemia:  This is chronic. Not at goal.  Pt is making efforts with healthy diet and taking pravastatin without adverse effect. Lab Results   Component Value Date/Time    Cholesterol, total 201 (H) 2018 09:20 AM    HDL Cholesterol 35 (L) 2018 09:20 AM    LDL, calculated 139.4 (H) 2018 09:20 AM    VLDL, calculated 26.6 2018 09:20 AM    Triglyceride 133 2018 09:20 AM    CHOL/HDL Ratio 5.7 (H) 2018 09:20 AM      Benign familial tremor  This is a chronic problem. This is stable. Pt takes propranolol. Pt reports compliance with this medication.     Insomnia  This is a chronic problem. This is stable. Pt reports improvement with tylenol PM. Melatonin was not effective. Pt was given information on sleep hygiene last time. 3 most recent PHQ Screens 2019   Little interest or pleasure in doing things Not at all   Feeling down, depressed, irritable, or hopeless Not at all   Total Score PHQ 2 0      Past Medical History:   Diagnosis Date    Calculus of kidney     H/O bronchitis     Insomnia 2019    Pure hypercholesterolemia 2019    Shingles      History reviewed. No pertinent surgical history. reports that she quit smoking about 4 years ago. She has a 10.00 pack-year smoking history. She has never used smokeless tobacco. She reports that she does not drink alcohol or use drugs.   Family History   Problem Relation Age of Onset    Tremors Mother     Cancer Mother         stomach    No Known Problems Father     Emphysema Maternal Aunt     No Known Problems Maternal Uncle     No Known Problems Maternal Grandmother     Tremors Maternal Grandfather     Hypertension Son     Hypertension Son      No Known Allergies    Review of Systems   CV:      chest pain, palpitations  PULM:  SOB, wheezing, cough, sputum production    Current Outpatient Meds and Allergies     Current Outpatient Medications on File Prior to Visit   Medication Sig Dispense Refill    pravastatin (PRAVACHOL) 40 mg tablet TAKE 1 TABLET BY MOUTH EVERY NIGHT 90 Tab 1    propranolol (INDERAL) 20 mg tablet Take 1 Tab by mouth two (2) times daily as needed (tremor). 60 Tab 3     No current facility-administered medications on file prior to visit. Objective:     Visit Vitals  /70 (BP 1 Location: Left arm, BP Patient Position: Sitting)   Pulse 63   Temp 97.9 °F (36.6 °C) (Oral)   Resp 15   Ht 5' (1.524 m)   Wt 96 lb 6.4 oz (43.7 kg)   SpO2 95%   BMI 18.83 kg/m²     General:   Well-nourished, well-groomed, pleasant, alert, in no acute distress  Head:  Normocephalic, atraumatic  Ears:  External ears WNL  Nose:  External nares WNL  Psych:  No pressured speech, no abnormal thought content    Lab Results   Component Value Date/Time    Glucose 80 06/28/2018 08:53 AM    LDL, calculated 139.4 (H) 09/11/2018 09:20 AM    Creatinine 0.80 06/28/2018 08:53 AM       Assessment/Plan & Orders:         ICD-10-CM ICD-9-CM    1. Pure hypercholesterolemia E78.00 272.0 LIPID PANEL      METABOLIC PANEL, COMPREHENSIVE   2. Benign familial tremor G25.0 333.1    3. Primary insomnia F51.01 307.42    4. Elevated blood sugar R73.9 790.29 HEMOGLOBIN A1C WITH EAG   5. Vitamin D deficiency E55.9 268.9 VITAMIN D, 25 HYDROXY   6. Screen for colon cancer Z12.11 V76.51 OCCULT BLOOD IMMUNOASSAY,DIAGNOSTIC      Follow-up and Dispositions    · Return in about 3 months (around 12/5/2019) for Hyperlipidemia. *Patient verbalized understanding and agreement with the plan. Patient was given an after-visit summary. Nithin Draper.  [address], MD - Internal Medicine  9/5/2019, 12:57 PM  [facility]  [address] [place] Khan[place]y, [address]  Phone [phone]  Fax [phone]

## 2019-09-06 LAB
25(OH)D3 SERPL-MCNC: 17.6 NG/ML (ref 30–100)
CHOLEST SERPL-MCNC: 213 MG/DL
HDLC SERPL-MCNC: 38 MG/DL (ref 40–60)
HDLC SERPL: 5.6 {RATIO} (ref 0–5)
LDLC SERPL CALC-MCNC: 143.2 MG/DL (ref 0–100)
LIPID PROFILE,FLP: ABNORMAL
TRIGL SERPL-MCNC: 159 MG/DL (ref ?–150)
VLDLC SERPL CALC-MCNC: 31.8 MG/DL

## 2019-10-16 ENCOUNTER — HOSPITAL ENCOUNTER (OUTPATIENT)
Dept: LAB | Age: 74
Discharge: HOME | End: 2019-10-16
Payer: MEDICARE

## 2019-10-16 DIAGNOSIS — Z12.11 SCREEN FOR COLON CANCER: ICD-10-CM

## 2019-10-16 PROCEDURE — 82274 ASSAY TEST FOR BLOOD FECAL: CPT

## 2019-10-22 LAB — HEMOCCULT STL QL IA: NEGATIVE

## 2019-12-16 PROBLEM — E78.00 PURE HYPERCHOLESTEROLEMIA: Status: RESOLVED | Noted: 2019-06-04 | Resolved: 2019-12-16

## 2019-12-16 PROBLEM — E55.9 VITAMIN D DEFICIENCY: Status: ACTIVE | Noted: 2019-12-16

## 2020-02-24 ENCOUNTER — OFFICE VISIT (OUTPATIENT)
Dept: FAMILY MEDICINE CLINIC | Facility: CLINIC | Age: 75
End: 2020-02-24

## 2020-02-24 VITALS
BODY MASS INDEX: 20.93 KG/M2 | HEART RATE: 60 BPM | HEIGHT: 60 IN | TEMPERATURE: 96.3 F | OXYGEN SATURATION: 96 % | RESPIRATION RATE: 16 BRPM | DIASTOLIC BLOOD PRESSURE: 60 MMHG | SYSTOLIC BLOOD PRESSURE: 130 MMHG | WEIGHT: 106.6 LBS

## 2020-02-24 DIAGNOSIS — F51.01 PRIMARY INSOMNIA: ICD-10-CM

## 2020-02-24 DIAGNOSIS — F33.9 RECURRENT DEPRESSION (HCC): ICD-10-CM

## 2020-02-24 DIAGNOSIS — Z23 ENCOUNTER FOR IMMUNIZATION: ICD-10-CM

## 2020-02-24 DIAGNOSIS — E78.2 MIXED HYPERLIPIDEMIA: Primary | ICD-10-CM

## 2020-02-24 DIAGNOSIS — E55.9 VITAMIN D DEFICIENCY: ICD-10-CM

## 2020-02-24 PROBLEM — M85.80 OSTEOPENIA: Status: ACTIVE | Noted: 2020-02-24

## 2020-02-24 RX ORDER — MULTIVITAMIN
1 TABLET ORAL 2 TIMES DAILY
Qty: 180 TAB | Refills: 3 | Status: SHIPPED | OUTPATIENT
Start: 2020-02-24 | End: 2021-06-10 | Stop reason: ALTCHOICE

## 2020-02-24 NOTE — PATIENT INSTRUCTIONS
Depression and Chronic Disease: Care Instructions Your Care Instructions A chronic disease is one that you have for a long time. Some chronic diseases can be controlled, but they usually cannot be cured. Depression is common in people with chronic diseases, but it often goes unnoticed. Many people have concerns about seeking treatment for a mental health problem. You may think it's a sign of weakness, or you don't want people to know about it. It's important to overcome these reasons for not seeking treatment. Treating depression or anxiety is good for your health. Follow-up care is a key part of your treatment and safety. Be sure to make and go to all appointments, and call your doctor if you are having problems. It's also a good idea to know your test results and keep a list of the medicines you take. How can you care for yourself at home? Watch for symptoms of depression The symptoms of depression are often subtle at first. You may think they are caused by your disease rather than depression. Or you may think it is normal to be depressed when you have a chronic disease. If you are depressed you may: · Feel sad or hopeless. · Feel guilty or worthless. · Not enjoy the things you used to enjoy. · Feel hopeless, as though life is not worth living. · Have trouble thinking or remembering. · Have low energy, and you may not eat or sleep well. · Pull away from others. · Think often about death or killing yourself. (Keep the numbers for these national suicide hotlines: 7-889-694-TALK [1-411.407.9487] and 5-799-AMIPHVK [1-544.992.7589]. ) Get treatment By treating your depression, you can feel more hopeful and have more energy. If you feel better, you may take better care of yourself, so your health may improve. · Talk to your doctor if you have any changes in mood during treatment for your disease. · Ask your doctor for help.  Counseling, antidepressant medicine, or a combination of the two can help most people with depression. Often a combination works best. Counseling can also help you cope with having a chronic disease. When should you call for help? Call 911 anytime you think you may need emergency care. For example, call if: 
  · You feel like hurting yourself or someone else.  
  · Someone you know has depression and is about to attempt or is attempting suicide.  
[place] your doctor now or seek immediate medical care if: 
  · You hear voices.  
  · Someone you know has depression and: 
? Starts to give away his or her possessions. ? Uses illegal drugs or drinks alcohol heavily. ? Talks or writes about death, including writing suicide notes or talking about guns, knives, or pills. ? Starts to spend a lot of time alone. ? Acts very aggressively or suddenly appears calm.  
 Watch closely for changes in your health, and be sure to contact your doctor if: 
  · You do not get better as expected. Where can you learn more? Go to http://zia-jono.info/. Enter L575 in the search box to learn more about \"Depression and Chronic Disease: Care Instructions. \" Current as of: May 28, 2019 Content Version: 12.2 © 4600-9067 [employer]. Care instructions adapted under license by [employer] (which disclaims liability or warranty for this information). If you have questions about a medical condition or this instruction, always ask your healthcare professional. [place] [address] any warranty or liability for your use of this information.

## 2020-02-24 NOTE — PROGRESS NOTES
Ludy Dawkins is a 77 y.o.  female presents today for office visit for follow up. Pt would also like to discuss htn. Pt is not fasting. Pt is in Room # 2      1. Have you been to the ER, urgent care clinic since your last visit? Hospitalized since your last visit? No    2. Have you seen or consulted any other health care providers outside of the [address] [place] [address] since your last visit? Include any pap smears or colon screening. No    Upcoming Appts  none    Health Maintenance reviewed      VORB: No orders of the defined types were placed in this encounter. Jeremy Rendon, [address] [facility], [place]         Ludy Dawkins is a 77 y.o. female who presents for routine immunizations. She denies any symptoms , reactions or allergies that would exclude them from being immunized today. Risks and adverse reactions were discussed and the VIS was given to them. All questions were addressed. She was observed for 10 min post injection. There were no reactions observed.     William Dolan LPN

## 2020-02-24 NOTE — PROGRESS NOTES
Internal Medicine Progress Note    Today's Date:  2020   Patient:  Eileen Steel  Patient :  1945    Subjective:     Chief Complaint   Patient presents with    Cholesterol Problem    Sleep Problem     since sept    Immunization/Injection     flu    Depression      Hyperlipidemia  This is chronic. Not at goal.  Pt is making efforts with healthy diet. Pt is taking pravastatin without adverse effect. Depression  This is a chronic problem. This is not controlled. Pt takes no medication for this. She declines an antidepressant today.       Insomnia  This is a chronic problem. This is stable. Pt reports improvement with tylenol PM. Melatonin was not effective. Pt was given information on sleep hygiene previously. 3 most recent PHQ Screens 2020   Little interest or pleasure in doing things Not at all   Feeling down, depressed, irritable, or hopeless Nearly every day   Total Score PHQ 2 3   Trouble falling or staying asleep, or sleeping too much Nearly every day   Feeling tired or having little energy More than half the days   Poor appetite, weight loss, or overeating Not at all   Feeling bad about yourself - or that you are a failure or have let yourself or your family down Not at all   Trouble concentrating on things such as school, work, reading, or watching TV Not at all   Moving or speaking so slowly that other people could have noticed; or the opposite being so fidgety that others notice Not at all   Thoughts of being better off dead, or hurting yourself in some way Not at all   PHQ 9 Score 8      Past Medical History:   Diagnosis Date    Calculus of kidney     H/O bronchitis     Insomnia 2019    Osteopenia 2020    Pure hypercholesterolemia 2019    Recurrent depression ([facility] [address] 2020    Shingles     Vitamin D deficiency 2019     History reviewed. No pertinent surgical history. reports that she quit smoking about 4 years ago.  She has a 10.00 pack-year smoking history. She has never used smokeless tobacco. She reports that she does not drink alcohol or use drugs. Family History   Problem Relation Age of Onset    Tremors Mother     Cancer Mother         stomach    No Known Problems Father     Emphysema Maternal Aunt     No Known Problems Maternal Uncle     No Known Problems Maternal Grandmother     Tremors Maternal Grandfather     Hypertension Son     Hypertension Son      No Known Allergies    Review of Systems   CV:      chest pain, palpitations  PULM:  SOB, wheezing, cough, sputum production    Current Outpatient Meds     Current Outpatient Medications on File Prior to Visit   Medication Sig Dispense Refill    pravastatin (PRAVACHOL) 40 mg tablet TAKE 1 TABLET BY MOUTH EVERY NIGHT 90 Tab 3    [DISCONTINUED] propranolol (INDERAL) 20 mg tablet Take 1 Tab by mouth two (2) times daily as needed (tremor). 60 Tab 3     No current facility-administered medications on file prior to visit. Objective:       Visit Vitals  /60 (BP 1 Location: Left arm, BP Patient Position: Sitting)   Pulse 60   Temp 96.3 °F (35.7 °C) (Oral)   Resp 16   Ht 5' (1.524 m)   Wt 106 lb 9.6 oz (48.4 kg)   SpO2 96%   BMI 20.82 kg/m²     General:   Well-nourished, well-groomed, pleasant, alert, in no acute distress  Head:  Normocephalic, atraumatic  Ears:  External ears WNL  Nose:  External nares WNL  Psych:  No pressured speech, no abnormal thought content    Lab Results   Component Value Date/Time    Hemoglobin A1c 5.6 09/05/2019 08:45 AM    Glucose 78 09/05/2019 08:45 AM    LDL, calculated 143.2 (H) 09/05/2019 08:45 AM    Creatinine 0.92 09/05/2019 08:45 AM       Assessment/Plan & Orders:         ICD-10-CM ICD-9-CM    1. Mixed hyperlipidemia E78.2 272.2    2. Recurrent depression (HCC) F33.9 296.30    3. Primary insomnia F51.01 307.42    4. Vitamin D deficiency E55.9 268.9 calcium-cholecalciferol, D3, (CALCIUM 600 + D) tablet   5.  Encounter for immunization Z23 V03.89 INFLUENZA VACCINE INACTIVATED (IIV), SUBUNIT, ADJUVANTED, IM      Pt declines medication for depression or insomnia  Pt stopped propranolol due to GI side effects    Follow-up and Dispositions    · Return in about 14 weeks (around 6/1/2020) for Medicare wellness, 30 minutes, Hyperlipidemia. *Patient verbalized understanding and agreement with the plan. Patient was given an after-visit summary. Miryam Sarmiento.  Danitza Nolan MD - Internal Medicine  2/24/2020, 12:57 PM  [facility]  [address] [place] [address] [place], [address]  Phone [phone]  Fax [phone]

## 2020-09-16 ENCOUNTER — OFFICE VISIT (OUTPATIENT)
Dept: FAMILY MEDICINE CLINIC | Facility: CLINIC | Age: 75
End: 2020-09-16

## 2020-09-16 ENCOUNTER — HOSPITAL ENCOUNTER (OUTPATIENT)
Dept: LAB | Age: 75
Discharge: HOME | End: 2020-09-16
Payer: MEDICAID

## 2020-09-16 VITALS
HEIGHT: 60 IN | DIASTOLIC BLOOD PRESSURE: 71 MMHG | WEIGHT: 110 LBS | TEMPERATURE: 96.8 F | RESPIRATION RATE: 18 BRPM | SYSTOLIC BLOOD PRESSURE: 113 MMHG | OXYGEN SATURATION: 97 % | HEART RATE: 70 BPM | BODY MASS INDEX: 21.6 KG/M2

## 2020-09-16 DIAGNOSIS — Z71.89 ADVANCED CARE PLANNING/COUNSELING DISCUSSION: ICD-10-CM

## 2020-09-16 DIAGNOSIS — R73.9 ELEVATED BLOOD SUGAR: ICD-10-CM

## 2020-09-16 DIAGNOSIS — Z00.00 MEDICARE ANNUAL WELLNESS VISIT, SUBSEQUENT: Primary | ICD-10-CM

## 2020-09-16 DIAGNOSIS — F33.9 RECURRENT DEPRESSION (HCC): ICD-10-CM

## 2020-09-16 DIAGNOSIS — E55.9 VITAMIN D DEFICIENCY: ICD-10-CM

## 2020-09-16 DIAGNOSIS — Z23 ENCOUNTER FOR IMMUNIZATION: ICD-10-CM

## 2020-09-16 DIAGNOSIS — Z12.11 SCREEN FOR COLON CANCER: ICD-10-CM

## 2020-09-16 DIAGNOSIS — E78.2 MIXED HYPERLIPIDEMIA: ICD-10-CM

## 2020-09-16 DIAGNOSIS — Z13.31 SCREENING FOR DEPRESSION: ICD-10-CM

## 2020-09-16 LAB
25(OH)D3 SERPL-MCNC: 11.1 NG/ML (ref 30–100)
ALBUMIN SERPL-MCNC: 4.1 G/DL (ref 3.4–5)
ALBUMIN/GLOB SERPL: 1.1 {RATIO} (ref 0.8–1.7)
ALP SERPL-CCNC: 109 U/L (ref 45–117)
ALT SERPL-CCNC: 16 U/L (ref 13–56)
ANION GAP SERPL CALC-SCNC: 3 MMOL/L (ref 3–18)
AST SERPL-CCNC: 22 U/L (ref 10–38)
BASOPHILS # BLD: 0 K/UL (ref 0–0.1)
BASOPHILS NFR BLD: 0 % (ref 0–2)
BILIRUB SERPL-MCNC: 0.7 MG/DL (ref 0.2–1)
BUN SERPL-MCNC: 12 MG/DL (ref 7–18)
BUN/CREAT SERPL: 14 (ref 12–20)
CALCIUM SERPL-MCNC: 9.4 MG/DL (ref 8.5–10.1)
CHLORIDE SERPL-SCNC: 103 MMOL/L (ref 100–111)
CHOLEST SERPL-MCNC: 230 MG/DL
CO2 SERPL-SCNC: 33 MMOL/L (ref 21–32)
CREAT SERPL-MCNC: 0.88 MG/DL (ref 0.6–1.3)
DIFFERENTIAL METHOD BLD: NORMAL
EOSINOPHIL # BLD: 0.1 K/UL (ref 0–0.4)
EOSINOPHIL NFR BLD: 1 % (ref 0–5)
ERYTHROCYTE [DISTWIDTH] IN BLOOD BY AUTOMATED COUNT: 13.8 % (ref 11.6–14.5)
EST. AVERAGE GLUCOSE BLD GHB EST-MCNC: 105 MG/DL
GLOBULIN SER CALC-MCNC: 3.7 G/DL (ref 2–4)
GLUCOSE SERPL-MCNC: 72 MG/DL (ref 74–99)
HBA1C MFR BLD: 5.3 % (ref 4.2–5.6)
HCT VFR BLD AUTO: 42.6 % (ref 35–45)
HDLC SERPL-MCNC: 36 MG/DL (ref 40–60)
HDLC SERPL: 6.4 {RATIO} (ref 0–5)
HGB BLD-MCNC: 14 G/DL (ref 12–16)
LDLC SERPL CALC-MCNC: 137.6 MG/DL (ref 0–100)
LIPID PROFILE,FLP: ABNORMAL
LYMPHOCYTES # BLD: 2.3 K/UL (ref 0.9–3.6)
LYMPHOCYTES NFR BLD: 24 % (ref 21–52)
MCH RBC QN AUTO: 29.4 PG (ref 24–34)
MCHC RBC AUTO-ENTMCNC: 32.9 G/DL (ref 31–37)
MCV RBC AUTO: 89.3 FL (ref 74–97)
MONOCYTES # BLD: 0.5 K/UL (ref 0.05–1.2)
MONOCYTES NFR BLD: 6 % (ref 3–10)
NEUTS SEG # BLD: 6.5 K/UL (ref 1.8–8)
NEUTS SEG NFR BLD: 69 % (ref 40–73)
PLATELET # BLD AUTO: 198 K/UL (ref 135–420)
PMV BLD AUTO: 9.2 FL (ref 9.2–11.8)
POTASSIUM SERPL-SCNC: 4.6 MMOL/L (ref 3.5–5.5)
PROT SERPL-MCNC: 7.8 G/DL (ref 6.4–8.2)
RBC # BLD AUTO: 4.77 M/UL (ref 4.2–5.3)
SODIUM SERPL-SCNC: 139 MMOL/L (ref 136–145)
TRIGL SERPL-MCNC: 282 MG/DL (ref ?–150)
VLDLC SERPL CALC-MCNC: 56.4 MG/DL
WBC # BLD AUTO: 9.3 K/UL (ref 4.6–13.2)

## 2020-09-16 PROCEDURE — 83036 HEMOGLOBIN GLYCOSYLATED A1C: CPT

## 2020-09-16 PROCEDURE — 85025 COMPLETE CBC W/AUTO DIFF WBC: CPT

## 2020-09-16 PROCEDURE — 80061 LIPID PANEL: CPT

## 2020-09-16 PROCEDURE — 80053 COMPREHEN METABOLIC PANEL: CPT

## 2020-09-16 PROCEDURE — 82306 VITAMIN D 25 HYDROXY: CPT

## 2020-09-16 NOTE — PROGRESS NOTES
Mckenna Bowling is a 77 y.o. female and presents for annual Medicare Wellness Visit. Problem List: Reviewed with patient and discussed risk factors. Patient Active Problem List   Diagnosis Code    Benign familial tremor G25.0    Mixed hyperlipidemia E78.2    Insomnia G47.00    Vitamin D deficiency E55.9    Recurrent depression ([facility] [address] F33.9    Osteopenia M85.80     Current medical providers:  Patient Care Team:  Dickson Manning MD as PCP - General (Internal Medicine)  Severo Ramsay MD as Physician (Urology)    PSH: Reviewed with patient  History reviewed. No pertinent surgical history. SH: Reviewed with patient  Social History     Tobacco Use    Smoking status: Former Smoker     Packs/day: 0.50     Years: 20.00     Pack years: 10.00     Last attempt to quit: 2015     Years since quittin.2    Smokeless tobacco: Never Used   Substance Use Topics    Alcohol use: No    Drug use: No     FH: Reviewed with patient  Family History   Problem Relation Age of Onset    Tremors Mother     Cancer Mother         stomach    No Known Problems Father     Emphysema Maternal Aunt     No Known Problems Maternal Uncle     No Known Problems Maternal Grandmother     Tremors Maternal Grandfather     Hypertension Son     Hypertension Son      Medications/Allergies: Reviewed with patient  Current Outpatient Medications on File Prior to Visit   Medication Sig Dispense Refill    calcium-cholecalciferol, D3, (CALCIUM 600 + D) tablet Take 1 Tab by mouth two (2) times a day. 180 Tab 3    pravastatin (PRAVACHOL) 40 mg tablet TAKE 1 TABLET BY MOUTH EVERY NIGHT 90 Tab 3     No current facility-administered medications on file prior to visit.        No Known Allergies    Objective:  Visit Vitals  /71 (BP 1 Location: Right arm, BP Patient Position: Sitting)   Pulse 70   Temp 96.8 °F (36 °C) (Oral)   Resp 18   Ht 5' (1.524 m)   Wt 110 lb (49.9 kg)   SpO2 97%   BMI 21.48 kg/m²    Body mass index is Pt called for clarification of f/u appts etc  Reviewed appt and instructed him to get an xray prior,  If transport becomes an issue he may try to come earlier on appt  day and have xray then  He was in agreement  21.48 kg/m². Assessment of cognitive impairment: Alert and oriented x 3  Depression Screen:   3 most recent PHQ Screens 2/24/2020   Little interest or pleasure in doing things Not at all   Feeling down, depressed, irritable, or hopeless Nearly every day   Total Score PHQ 2 3   Trouble falling or staying asleep, or sleeping too much Nearly every day   Feeling tired or having little energy More than half the days   Poor appetite, weight loss, or overeating Not at all   Feeling bad about yourself - or that you are a failure or have let yourself or your family down Not at all   Trouble concentrating on things such as school, work, reading, or watching TV Not at all   Moving or speaking so slowly that other people could have noticed; or the opposite being so fidgety that others notice Not at all   Thoughts of being better off dead, or hurting yourself in some way Not at all   PHQ 9 Score 8     Fall Risk Assessment:    Fall Risk Assessment, last 12 mths 9/16/2020   Able to walk? Yes   Fall in past 12 months? Yes   Fall with injury? No   Number of falls in past 12 months 1   Fall Risk Score 1     Functional Ability:   Does the patient exhibit a steady gait? yes   How long did it take the patient to get up and walk from a sitting position? 2 seconds   Is the patient self reliant?  (ie can do own laundry, meals, household chores)  Does most of it since she has tremors   Does the patient handle his/her own medications? yes   Does the patient handle his/her own money? yes   Is the patients home safe (ie good lighting, handrails on stairs and bath, etc.)? yes   Did you notice or did patient express any hearing difficulties? no   Did you notice of did patient express any vision difficulties?   no   Were distance and reading eye charts used?   yes     Advance Care Planning:   Patient was offered the opportunity to discuss advance care planning:  yes   Does patient have an Advance Directive:  No. Pt would like to appoint her son, Matt Bowman, as her medical decision maker in the event that she can no longer do so. Phone number is [phone]. Her secondary person is her grandson, Calista Faria. Phone number is . If her death is imminent and medical treatment will not help her recover, pt does not want life prolonging measures. If her condition makes her unaware of herself or her surroundings and she cannot interact with others, pt does not want life prolonging measures. Advance directive scanned in the chart under media. If no, did you provide information on Caring Connections?  no     Plan:    Orders Placed This Encounter    ADVANCE CARE PLANNING FIRST 30 MINS    CBC WITH AUTOMATED DIFF    LIPID PANEL    METABOLIC PANEL, COMPREHENSIVE    HEMOGLOBIN A1C WITH EAG    VITAMIN D, 25 HYDROXY    OCCULT BLOOD IMMUNOASSAY,DIAGNOSTIC     Health Maintenance   Topic Date Due    Breast Cancer Screen Mammogram  2020    Flu Vaccine (1) 2020    Lipid Screen  2020    FOBT Q1Y Age 54-65  10/16/2020    Shingrix Vaccine Age 50> (1 of 2) 03/10/2021 (Originally 1996)    GLAUCOMA SCREENING Q2Y  2021 (Originally 2019)    DTaP/Tdap/Td series (1 - Tdap) 2021 (Originally 1967)    Pneumococcal 65+ years (1 of 1 - PPSV23) 2021 (Originally 2011)    Medicare Yearly Exam  2021    Hepatitis C Screening  Completed    Bone Densitometry (Dexa) Screening  Completed     Time spent counseling pt on advanced care plannin minutes    *Patient verbalized understanding and agreement with the plan. A copy of the After Visit Summary with personalized health plan was given to the patient today. Follow-up and Dispositions    · Return in about 4 weeks (around 10/14/2020) for Go over labs/imaging, insomnia. Joaquín Wilson MD - Internal Medicine  2020, 1:05 PM  [facility]  [address] [place], [address]  Phone [phone]  Fax [phone]

## 2020-09-16 NOTE — PROGRESS NOTES
Internal Medicine Progress Note    Today's Date:  2020   Patient:  Ashly Masters  Patient :  1945    Subjective:     Chief Complaint   Patient presents with    Annual Wellness Visit    Cholesterol Problem    Insomnia    Immunization/Injection    Depression      Hyperlipidemia  This is chronic. This is not at goal.  Pt is on pravastatin. Pt reports compliance with this medication. Depression  This is a chronic problem. This is not controlled. Pt takes no medication for this. Pt's   in March. Her son  last year around this time.      Insomnia  This is a chronic problem. This is not at goal. Pt reports improvement with tylenol PM. Melatonin was not effective. Pt was given information on sleep hygiene previously. Pt takes her friend's valium sometimes. 3 most recent PHQ Screens 2020   Little interest or pleasure in doing things Not at all   Feeling down, depressed, irritable, or hopeless Nearly every day   Total Score PHQ 2 3   Trouble falling or staying asleep, or sleeping too much Nearly every day   Feeling tired or having little energy More than half the days   Poor appetite, weight loss, or overeating Not at all   Feeling bad about yourself - or that you are a failure or have let yourself or your family down Not at all   Trouble concentrating on things such as school, work, reading, or watching TV Not at all   Moving or speaking so slowly that other people could have noticed; or the opposite being so fidgety that others notice Not at all   Thoughts of being better off dead, or hurting yourself in some way Not at all   PHQ 9 Score 8      Past Medical History:   Diagnosis Date    Calculus of kidney     H/O bronchitis     Insomnia 2019    Osteopenia 2020    Pure hypercholesterolemia 2019    Recurrent depression ([facility] [address] 2020    Shingles     Vitamin D deficiency 2019     History reviewed. No pertinent surgical history.    reports that she quit smoking about 5 years ago. She has a 10.00 pack-year smoking history. She has never used smokeless tobacco. She reports that she does not drink alcohol or use drugs. Family History   Problem Relation Age of Onset    Tremors Mother     Cancer Mother         stomach    No Known Problems Father     Emphysema Maternal Aunt     No Known Problems Maternal Uncle     No Known Problems Maternal Grandmother     Tremors Maternal Grandfather     Hypertension Son     Hypertension Son      No Known Allergies    Review of Systems   CV:      chest pain, palpitations  PULM:  SOB, wheezing, cough, sputum production    Current Outpatient Meds     Current Outpatient Medications on File Prior to Visit   Medication Sig Dispense Refill    calcium-cholecalciferol, D3, (CALCIUM 600 + D) tablet Take 1 Tab by mouth two (2) times a day. 180 Tab 3    pravastatin (PRAVACHOL) 40 mg tablet TAKE 1 TABLET BY MOUTH EVERY NIGHT 90 Tab 3     No current facility-administered medications on file prior to visit. Objective:       Visit Vitals  /71 (BP 1 Location: Right arm, BP Patient Position: Sitting)   Pulse 70   Temp 96.8 °F (36 °C) (Oral)   Resp 18   Ht 5' (1.524 m)   Wt 110 lb (49.9 kg)   SpO2 97%   BMI 21.48 kg/m²     General:   Well-nourished, well-groomed, pleasant, alert, in no acute distress  Head:  Normocephalic, atraumatic  Ears:  External ears WNL  Nose:  External nares WNL  Psych:  No pressured speech, no abnormal thought content    Lab Results   Component Value Date/Time    Hemoglobin A1c 5.6 09/05/2019 08:45 AM    Glucose 78 09/05/2019 08:45 AM    LDL, calculated 143.2 (H) 09/05/2019 08:45 AM    Creatinine 0.92 09/05/2019 08:45 AM       Assessment/Plan & Orders:         ICD-10-CM ICD-9-CM    1. Medicare annual wellness visit, subsequent  Z00.00 V70.0    2. Recurrent depression (HCC)  F33.9 296.30    3.  Mixed hyperlipidemia  E78.2 272.2 CBC WITH AUTOMATED DIFF      LIPID PANEL      METABOLIC PANEL, COMPREHENSIVE 4. Vitamin D deficiency  E55.9 268.9 VITAMIN D, 25 HYDROXY   5. Elevated blood sugar  R73.9 790.29 HEMOGLOBIN A1C WITH EAG   6. Screening for depression  Z13.31 V79.0    7. Screen for colon cancer  Z12.11 V76.51 OCCULT BLOOD IMMUNOASSAY,DIAGNOSTIC   8. Advanced care planning/counseling discussion  Z71.89 V65.49 ADVANCE CARE PLANNING FIRST 27 MINS      Pt declines a mammogram  Will write for trazodone upon review of labs    Follow-up and Dispositions    · Return in about 4 weeks (around 10/14/2020) for Go over labs/imaging, insomnia. *Patient verbalized understanding and agreement with the plan. Patient was given an after-visit summary. Wilder Clark.  [facility], MD - Internal Medicine  9/16/2020, 12:57 PM  [facility]  [address] [place], [address]  Phone [phone]  Fax [phone]

## 2020-09-16 NOTE — ACP (ADVANCE CARE PLANNING)
Pt would like to appoint her son, Miriam Sparks, as her medical decision maker in the event that she can no longer do so. Phone number is [phone]. Her secondary person is her grandson, Carla Velasco. Phone number is . If her death is imminent and medical treatment will not help her recover, pt does not want life prolonging measures. If her condition makes her unaware of herself or her surroundings and she cannot interact with others, pt does not want life prolonging measures. Advance directive scanned in the chart under media.

## 2020-09-16 NOTE — PROGRESS NOTES
Jose Farris is a 77 y.o. female (: 1945) presenting to address:    Chief Complaint   Patient presents with    Annual Wellness Visit    Cholesterol Problem       Vitals:    20 0851 20 0900   BP: (!) 145/74 113/71   Pulse: 70    Resp: 18    Temp: 96.8 °F (36 °C)    TempSrc: Oral    SpO2: 97%    Weight: 110 lb (49.9 kg)    Height: 5' (1.524 m)    PainSc:   0 - No pain        Hearing/Vision:      Hearing Screening    125Hz 250Hz 500Hz 1000Hz 2000Hz 3000Hz 4000Hz 6000Hz 8000Hz   Right ear:            Left ear:               Visual Acuity Screening    Right eye Left eye Both eyes   Without correction: 20/50 20/40 20/25   With correction:          Learning Assessment:     Learning Assessment 2016   PRIMARY LEARNER Patient   HIGHEST LEVEL OF EDUCATION - PRIMARY LEARNER  [employer]   BARRIERS PRIMARY LEARNER NONE   CO-LEARNER CAREGIVER No   PRIMARY LANGUAGE ENGLISH   LEARNER PREFERENCE PRIMARY DEMONSTRATION     READING   ANSWERED BY patient   RELATIONSHIP SELF     Depression Screening:     3 most recent PHQ Screens 2020   Little interest or pleasure in doing things Not at all   Feeling down, depressed, irritable, or hopeless Nearly every day   Total Score PHQ 2 3   Trouble falling or staying asleep, or sleeping too much Nearly every day   Feeling tired or having little energy More than half the days   Poor appetite, weight loss, or overeating Not at all   Feeling bad about yourself - or that you are a failure or have let yourself or your family down Not at all   Trouble concentrating on things such as school, work, reading, or watching TV Not at all   Moving or speaking so slowly that other people could have noticed; or the opposite being so fidgety that others notice Not at all   Thoughts of being better off dead, or hurting yourself in some way Not at all   PHQ 9 Score 8     Fall Risk Assessment:     Fall Risk Assessment, last 12 mths 2020   Able to walk?  Yes   Fall in past 12 months? Yes   Fall with injury? No   Number of falls in past 12 months 1   Fall Risk Score 1     Abuse Screening:     Abuse Screening Questionnaire 9/16/2020   Do you ever feel afraid of your partner? N   Are you in a relationship with someone who physically or mentally threatens you? N   Is it safe for you to go home? Y     Coordination of Care Questionaire:   1. Have you been to the ER, urgent care clinic since your last visit? Hospitalized since your last visit? NO    2. Have you seen or consulted any other health care providers outside of the [address] [place] [address] since your last visit? Include any pap smears or colon screening. NO    Advanced Directive:   1. Do you have an Advanced Directive? NO    2. Would you like information on Advanced Directives?  NO

## 2020-09-16 NOTE — PATIENT INSTRUCTIONS
Schedule of Personalized Health Plan (Provide Copy to Patient) The best way to stay healthy is to live a healthy lifestyle. A healthy lifestyle includes regular exercise, eating a well-balanced diet, keeping a healthy weight and not smoking. Regular physical exams and screening tests are another important way to take care of yourself. Preventive exams provided by health care providers can find health problems early when treatment works best and can keep you from getting certain diseases or illnesses. Preventive services include exams, lab tests, screenings, shots, monitoring and information to help you take care of your own health. All people over 65 should have a pneumonia shot. Pneumonia shots are usually only needed once in a lifetime unless your doctor decides differently. All people over 65 should have a yearly flu shot. People over 65 are at medium to high risk for Hepatitis B. Three shots are needed for complete protection. In addition to your physical exam, some screening tests are recommended: 
 
 
Screening for Breast Cancer is recommended yearly with a mammogram. 
 
Screening for Cervical Cancer is recommended every two years (annually for certain risk factors, such as previous history of STD or abnormal PAP in past 7 years), with a Pelvic Exam with PAP 
 
 Here is a list of your current Health Maintenance items with a due date: 
Health Maintenance Topic Date Due  
 DTaP/Tdap/Td series (1 - Tdap) 01/13/1967  Pneumococcal 65+ years (1 of 1 - PPSV23) 01/13/2011  GLAUCOMA SCREENING Q2Y  04/07/2019  Medicare Yearly Exam  06/04/2020  Breast Cancer Screen Mammogram  07/19/2020  Flu Vaccine (1) 09/01/2020  Lipid Screen  09/05/2020  FOBT Q1Y Age 54-65  10/16/2020  Shingrix Vaccine Age 50> (1 of 2) 03/10/2021 (Originally 1/13/1996)  Hepatitis C Screening  Completed  Bone Densitometry (Dexa) Screening  Completed

## 2020-09-16 NOTE — PROGRESS NOTES
fluad quad Immunization/s administered 9/16/2020 by Peder Speaker with consent. Patient tolerated procedure well. No reactions noted.

## 2020-10-14 ENCOUNTER — OFFICE VISIT (OUTPATIENT)
Dept: FAMILY MEDICINE CLINIC | Age: 75
End: 2020-10-14
Payer: MEDICARE

## 2020-10-14 VITALS
SYSTOLIC BLOOD PRESSURE: 138 MMHG | RESPIRATION RATE: 15 BRPM | BODY MASS INDEX: 21.99 KG/M2 | WEIGHT: 112 LBS | DIASTOLIC BLOOD PRESSURE: 80 MMHG | OXYGEN SATURATION: 98 % | HEART RATE: 61 BPM | TEMPERATURE: 97.2 F | HEIGHT: 60 IN

## 2020-10-14 DIAGNOSIS — E55.9 VITAMIN D DEFICIENCY: ICD-10-CM

## 2020-10-14 DIAGNOSIS — E78.2 MIXED HYPERLIPIDEMIA: Primary | ICD-10-CM

## 2020-10-14 DIAGNOSIS — F51.01 PRIMARY INSOMNIA: ICD-10-CM

## 2020-10-14 PROCEDURE — 1100F PTFALLS ASSESS-DOCD GE2>/YR: CPT | Performed by: INTERNAL MEDICINE

## 2020-10-14 PROCEDURE — G8399 PT W/DXA RESULTS DOCUMENT: HCPCS | Performed by: INTERNAL MEDICINE

## 2020-10-14 PROCEDURE — G9717 DOC PT DX DEP/BP F/U NT REQ: HCPCS | Performed by: INTERNAL MEDICINE

## 2020-10-14 PROCEDURE — 3288F FALL RISK ASSESSMENT DOCD: CPT | Performed by: INTERNAL MEDICINE

## 2020-10-14 PROCEDURE — G8420 CALC BMI NORM PARAMETERS: HCPCS | Performed by: INTERNAL MEDICINE

## 2020-10-14 PROCEDURE — 99214 OFFICE O/P EST MOD 30 MIN: CPT | Performed by: INTERNAL MEDICINE

## 2020-10-14 PROCEDURE — G8536 NO DOC ELDER MAL SCRN: HCPCS | Performed by: INTERNAL MEDICINE

## 2020-10-14 PROCEDURE — G8427 DOCREV CUR MEDS BY ELIG CLIN: HCPCS | Performed by: INTERNAL MEDICINE

## 2020-10-14 PROCEDURE — 1090F PRES/ABSN URINE INCON ASSESS: CPT | Performed by: INTERNAL MEDICINE

## 2020-10-14 PROCEDURE — 3017F COLORECTAL CA SCREEN DOC REV: CPT | Performed by: INTERNAL MEDICINE

## 2020-10-14 RX ORDER — ERGOCALCIFEROL 1.25 MG/1
50000 CAPSULE ORAL
Qty: 8 CAP | Refills: 3 | Status: SHIPPED | OUTPATIENT
Start: 2020-10-14 | End: 2021-11-01

## 2020-10-14 RX ORDER — PRAVASTATIN SODIUM 40 MG/1
40 TABLET ORAL
Qty: 90 TAB | Refills: 3 | Status: SHIPPED | OUTPATIENT
Start: 2020-10-14 | End: 2022-02-15 | Stop reason: SDUPTHER

## 2020-10-14 RX ORDER — MIRTAZAPINE 7.5 MG/1
7.5 TABLET, FILM COATED ORAL
Qty: 90 TAB | Refills: 3 | Status: SHIPPED | OUTPATIENT
Start: 2020-10-14 | End: 2021-06-10 | Stop reason: ALTCHOICE

## 2020-10-14 NOTE — PROGRESS NOTES
Internal Medicine Progress Note    Today's Date:  10/14/2020   Patient:  Katerina Gilliland  Patient :  1945    Subjective:     Chief Complaint   Patient presents with    Insomnia    Results    Cholesterol Problem    Vitamin D Deficiency      Hyperlipidemia  This is chronic. This is not at goal.  Pt is on pravastatin. Pt reports compliance with this medication. Vitamin D deficiency  This is a new problem. This is not controlled. Pt takes no medication for this. Lab Results   Component Value Date/Time    Vitamin D 25-Hydroxy 11.1 (L) 2020 09:53 AM        Insomnia  This is a chronic problem. This is not at goal. Pt reports improvement with tylenol PM. Melatonin was not effective. Pt was given information on sleep hygiene previously. Pt takes her friend's valium sometimes. Pt's   in March. Her son  last year around this time.      3 most recent PHQ Screens 2020   Little interest or pleasure in doing things Not at all   Feeling down, depressed, irritable, or hopeless Nearly every day   Total Score PHQ 2 3   Trouble falling or staying asleep, or sleeping too much Nearly every day   Feeling tired or having little energy More than half the days   Poor appetite, weight loss, or overeating Not at all   Feeling bad about yourself - or that you are a failure or have let yourself or your family down Not at all   Trouble concentrating on things such as school, work, reading, or watching TV Not at all   Moving or speaking so slowly that other people could have noticed; or the opposite being so fidgety that others notice Not at all   Thoughts of being better off dead, or hurting yourself in some way Not at all   PHQ 9 Score 8      Past Medical History:   Diagnosis Date    Calculus of kidney     H/O bronchitis     Insomnia 2019    Osteopenia 2020    Pure hypercholesterolemia 2019    Recurrent depression ([facility]) 2020    Shingles     Vitamin D deficiency 12/16/2019     History reviewed. No pertinent surgical history. reports that she quit smoking about 5 years ago. She has a 10.00 pack-year smoking history. She has never used smokeless tobacco. She reports that she does not drink alcohol or use drugs. Family History   Problem Relation Age of Onset    Tremors Mother     Cancer Mother         stomach    No Known Problems Father     Emphysema Maternal Aunt     No Known Problems Maternal Uncle     No Known Problems Maternal Grandmother     Tremors Maternal Grandfather     Hypertension Son     Hypertension Son      No Known Allergies    Review of Systems   CV:      chest pain, palpitations  PULM:  SOB, wheezing, cough, sputum production    Current Outpatient Meds     Current Outpatient Medications on File Prior to Visit   Medication Sig Dispense Refill    calcium-cholecalciferol, D3, (CALCIUM 600 + D) tablet Take 1 Tab by mouth two (2) times a day. 180 Tab 3    [DISCONTINUED] pravastatin (PRAVACHOL) 40 mg tablet TAKE 1 TABLET BY MOUTH EVERY NIGHT 90 Tab 3     No current facility-administered medications on file prior to visit. Objective:       Visit Vitals  /80   Pulse 61   Temp 97.2 °F (36.2 °C) (Temporal)   Resp 15   Ht 5' (1.524 m)   Wt 112 lb (50.8 kg)   SpO2 98%   BMI 21.87 kg/m²     General:   Well-nourished, well-groomed, pleasant, alert, in no acute distress  Head:  Normocephalic, atraumatic  Ears:  External ears WNL  Nose:  External nares WNL  Psych:  No pressured speech, no abnormal thought content    Lab Results   Component Value Date/Time    Hemoglobin A1c 5.3 09/16/2020 09:53 AM    Hemoglobin A1c 5.6 09/05/2019 08:45 AM    Glucose 72 (L) 09/16/2020 09:53 AM    LDL, calculated 137.6 (H) 09/16/2020 09:53 AM    Creatinine 0.88 09/16/2020 09:53 AM       Assessment/Plan & Orders:         ICD-10-CM ICD-9-CM    1. Mixed hyperlipidemia  E78.2 272.2 pravastatin (PRAVACHOL) 40 mg tablet      LIPID PANEL      METABOLIC PANEL, BASIC   2.  Primary insomnia  F51.01 307.42 mirtazapine (REMERON) 7.5 mg tablet   3. Vitamin D deficiency  E55.9 268.9 VITAMIN D, 25 HYDROXY      ergocalciferol (ERGOCALCIFEROL) 1,250 mcg (50,000 unit) capsule      Follow-up and Dispositions    · Return in about 3 months (around 1/14/2021) for Go over labs/imaging. *Patient verbalized understanding and agreement with the plan. Patient was given an after-visit summary. Jairo Wilson MD - Internal Medicine  10/14/2020, 12:57 PM  [facility]  [address] [place], [address]  Phone [phone]  Fax [phone]

## 2020-10-14 NOTE — PROGRESS NOTES
Donna Champagne is a 77 y.o.  female presents today for office visit for follow up. Pt would also like to discuss sleep problem. Pt is not fasting. Pt is in Room # 3      1. Have you been to the ER, urgent care clinic since your last visit? Hospitalized since your last visit? No    2. Have you seen or consulted any other health care providers outside of the [address] [place] [address] since your last visit? Include any pap smears or colon screening. No    Upcoming Appts  none    Health Maintenance reviewed       VORB: No orders of the defined types were placed in this encounter.   Siria Mccrary, SANTOSN

## 2020-10-15 NOTE — PATIENT INSTRUCTIONS

## 2021-01-19 ENCOUNTER — VIRTUAL VISIT (OUTPATIENT)
Dept: FAMILY MEDICINE CLINIC | Age: 76
End: 2021-01-19
Payer: MEDICARE

## 2021-01-19 VITALS — HEIGHT: 61 IN | WEIGHT: 112 LBS | BODY MASS INDEX: 21.14 KG/M2

## 2021-01-19 DIAGNOSIS — R10.32 LEFT LOWER QUADRANT ABDOMINAL PAIN: Primary | ICD-10-CM

## 2021-01-19 DIAGNOSIS — E78.2 MIXED HYPERLIPIDEMIA: ICD-10-CM

## 2021-01-19 DIAGNOSIS — E55.9 VITAMIN D DEFICIENCY: ICD-10-CM

## 2021-01-19 PROCEDURE — 99213 OFFICE O/P EST LOW 20 MIN: CPT | Performed by: INTERNAL MEDICINE

## 2021-01-19 NOTE — PROGRESS NOTES
Wing Patel is a 77 y.o. female, evaluated via audio-only technology on 1/19/2021 for Abdominal Pain, Cholesterol Problem, and Vitamin D Deficiency    Assessment & Plan:   Diagnoses and all orders for this visit:    1. Left lower quadrant abdominal pain    2. Mixed hyperlipidemia  -     CBC WITH AUTOMATED DIFF; Future  -     LIPID PANEL; Future  -     METABOLIC PANEL, COMPREHENSIVE; Future  -     HEMOGLOBIN A1C WITH EAG; Future    3. Vitamin D deficiency  -     VITAMIN D, 25 HYDROXY; Future    Pain may be from gas or something pt ate  Advised pt she can be seen in the office for it if pain persists    Follow-up and Dispositions    · Return in about 8 months (around 9/16/2021) for Go over labs/imaging. Subjective:     Hyperlipidemia  This is chronic.  This is not at goal.  Pt is on pravastatin. Pt reports compliance with this medication.      Vitamin D deficiency  This is a new problem. This is not controlled. Pt takes ergocalciferol. Pt reports compliance with this medication.      Abdominal cramping  This is a chronic problem. This is stable. Pain is located in the left lower quadrant. This has been present for three months. Fit test in September was negative. Pt reports normal bowel movements and no difficulty with passing stool. Pain lasts a few minutes then stops.      3 most recent PHQ Screens 2/24/2020   Little interest or pleasure in doing things Not at all   Feeling down, depressed, irritable, or hopeless Nearly every day   Total Score PHQ 2 3   Trouble falling or staying asleep, or sleeping too much Nearly every day   Feeling tired or having little energy More than half the days   Poor appetite, weight loss, or overeating Not at all   Feeling bad about yourself - or that you are a failure or have let yourself or your family down Not at all   Trouble concentrating on things such as school, work, reading, or watching TV Not at all   Moving or speaking so slowly that other people could have noticed; or the opposite being so fidgety that others notice Not at all   Thoughts of being better off dead, or hurting yourself in some way Not at all   PHQ 9 Score 8      Prior to Admission medications    Medication Sig Start Date End Date Taking? Authorizing Provider   ergocalciferol (ERGOCALCIFEROL) 1,250 mcg (50,000 unit) capsule Take 1 Cap by mouth every seven (7) days. 10/14/20  Yes Elisa Evangelista MD   pravastatin (PRAVACHOL) 40 mg tablet Take 1 Tab by mouth nightly. 10/14/20  Yes Elisa Evangelista MD   mirtazapine (REMERON) 7.5 mg tablet Take 1 Tab by mouth nightly. 10/14/20  Yes Elisa Evangelista MD   calcium-cholecalciferol, D3, (CALCIUM 600 + D) tablet Take 1 Tab by mouth two (2) times a day. 2/24/20  Yes Elisa Evangelista MD     Patient Active Problem List   Diagnosis Code    Benign familial tremor G25.0    Mixed hyperlipidemia E78.2    Insomnia G47.00    Vitamin D deficiency E55.9    Recurrent depression ([facility] [address] F33.9    Osteopenia M85.80     ROS  Cardiac: no chest pain or palpitations  Respiratory: no shortness of breath or cough     Sergio Pinzon, who was evaluated through a patient-initiated, synchronous (real-time) audio only encounter, and/or her healthcare decision maker, is aware that it is a billable service, with coverage as determined by her insurance carrier. She provided verbal consent to proceed: Yes. She has not had a related appointment within my department in the past 7 days or scheduled within the next 24 hours.     Total Time: minutes: 21-30 minutes    Jerri Luke MD

## 2021-01-19 NOTE — PROGRESS NOTES
Pavithra Del Cid is a 76 y.o.  female presents today for office visit for follow up. Pt would also like to discuss Cholesterol problem.     1. Have you been to the ER, urgent care clinic since your last visit?  Hospitalized since your last visit?No    2. Have you seen or consulted any other health care providers outside of the [facility] since your last visit?  Include any pap smears or colon screening. No    Upcoming Appts  none    Health Maintenance reviewed     VORB: No orders of the defined types were placed in this encounter.  Agnes Wilson, Maira Cristina LPN

## 2021-01-19 NOTE — PATIENT INSTRUCTIONS
Abdominal Pain: Care Instructions Your Care Instructions Abdominal pain has many possible causes. Some aren't serious and get better on their own in a few days. Others need more testing and treatment. If your pain continues or gets worse, you need to be rechecked and may need more tests to find out what is wrong. You may need surgery to correct the problem. Don't ignore new symptoms, such as fever, nausea and vomiting, urination problems, pain that gets worse, and dizziness. These may be signs of a more serious problem. Your doctor may have recommended a follow-up visit in the next 8 to 12 hours. If you are not getting better, you may need more tests or treatment. The doctor has checked you carefully, but problems can develop later. If you notice any problems or new symptoms, get medical treatment right away. Follow-up care is a key part of your treatment and safety. Be sure to make and go to all appointments, and call your doctor if you are having problems. It's also a good idea to know your test results and keep a list of the medicines you take. How can you care for yourself at home? · Rest until you feel better. · To prevent dehydration, drink plenty of fluids, enough so that your urine is light yellow or clear like water. Choose water and other caffeine-free clear liquids until you feel better. If you have kidney, heart, or liver disease and have to limit fluids, talk with your doctor before you increase the amount of fluids you drink. · If your stomach is upset, eat mild foods, such as rice, dry toast or crackers, bananas, and applesauce. Try eating several small meals instead of two or three large ones. · Wait until 48 hours after all symptoms have gone away before you have spicy foods, alcohol, and drinks that contain caffeine. · Do not eat foods that are high in fat. · Avoid anti-inflammatory medicines such as aspirin, ibuprofen (Advil, Motrin), and naproxen (Aleve). These can cause stomach upset. Talk to your doctor if you take daily aspirin for another health problem. 
When should you call for help? 
 Call 911 anytime you think you may need emergency care. For example, call if: 
  · You passed out (lost consciousness).  
  · You pass maroon or very bloody stools.  
  · You vomit blood or what looks like coffee grounds.  
  · You have new, severe belly pain.  
Call your doctor now or seek immediate medical care if: 
  · Your pain gets worse, especially if it becomes focused in one area of your belly.  
  · You have a new or higher fever.  
  · Your stools are black and look like tar, or they have streaks of blood.  
  · You have unexpected vaginal bleeding.  
  · You have symptoms of a urinary tract infection. These may include: 
? Pain when you urinate. 
? Urinating more often than usual. 
? Blood in your urine.  
  · You are dizzy or lightheaded, or you feel like you may faint.  
Watch closely for changes in your health, and be sure to contact your doctor if: 
  · You are not getting better after 1 day (24 hours).  
Where can you learn more? 
Go to https://www.[employer].net/LitResonnections 
Enter E907 in the search box to learn more about \"Abdominal Pain: Care Instructions.\" 
Current as of: June 26, 2019               Content Version: 12.6 
© 2438-0643 [employer].  
Care instructions adapted under license by [employer] (which disclaims liability or warranty for this information). If you have questions about a medical condition or this instruction, always ask your healthcare professional. [employer] disclaims any warranty or liability for your use of this information.

## 2021-06-10 ENCOUNTER — OFFICE VISIT (OUTPATIENT)
Dept: FAMILY MEDICINE CLINIC | Age: 76
End: 2021-06-10
Payer: MEDICARE

## 2021-06-10 VITALS
RESPIRATION RATE: 16 BRPM | OXYGEN SATURATION: 96 % | HEIGHT: 61 IN | HEART RATE: 62 BPM | WEIGHT: 106 LBS | SYSTOLIC BLOOD PRESSURE: 130 MMHG | DIASTOLIC BLOOD PRESSURE: 80 MMHG | BODY MASS INDEX: 20.01 KG/M2 | TEMPERATURE: 97 F

## 2021-06-10 DIAGNOSIS — E55.9 VITAMIN D DEFICIENCY: ICD-10-CM

## 2021-06-10 DIAGNOSIS — F51.01 PRIMARY INSOMNIA: Primary | ICD-10-CM

## 2021-06-10 DIAGNOSIS — E78.2 MIXED HYPERLIPIDEMIA: ICD-10-CM

## 2021-06-10 PROCEDURE — G8427 DOCREV CUR MEDS BY ELIG CLIN: HCPCS | Performed by: INTERNAL MEDICINE

## 2021-06-10 PROCEDURE — G8399 PT W/DXA RESULTS DOCUMENT: HCPCS | Performed by: INTERNAL MEDICINE

## 2021-06-10 PROCEDURE — 1100F PTFALLS ASSESS-DOCD GE2>/YR: CPT | Performed by: INTERNAL MEDICINE

## 2021-06-10 PROCEDURE — 3288F FALL RISK ASSESSMENT DOCD: CPT | Performed by: INTERNAL MEDICINE

## 2021-06-10 PROCEDURE — G9717 DOC PT DX DEP/BP F/U NT REQ: HCPCS | Performed by: INTERNAL MEDICINE

## 2021-06-10 PROCEDURE — 99214 OFFICE O/P EST MOD 30 MIN: CPT | Performed by: INTERNAL MEDICINE

## 2021-06-10 PROCEDURE — G8420 CALC BMI NORM PARAMETERS: HCPCS | Performed by: INTERNAL MEDICINE

## 2021-06-10 PROCEDURE — 3017F COLORECTAL CA SCREEN DOC REV: CPT | Performed by: INTERNAL MEDICINE

## 2021-06-10 PROCEDURE — 1090F PRES/ABSN URINE INCON ASSESS: CPT | Performed by: INTERNAL MEDICINE

## 2021-06-10 PROCEDURE — G8536 NO DOC ELDER MAL SCRN: HCPCS | Performed by: INTERNAL MEDICINE

## 2021-06-10 NOTE — PATIENT INSTRUCTIONS

## 2021-06-10 NOTE — PROGRESS NOTES
Dom Hutton is a 77 y.o.  female presents today for office visit for follow up. Pt would also like to discuss cholesterol problem. Pt is  fasting. Pt is in Room # 3      1. Have you been to the ER, urgent care clinic since your last visit? Hospitalized since your last visit? No    2. Have you seen or consulted any other health care providers outside of the [address] [place] [address] since your last visit? Include any pap smears or colon screening. No    Upcoming Appts  none    Health Maintenance reviewed      VORB: No orders of the defined types were placed in this encounter.   Monae Bustillos LPN

## 2021-06-10 NOTE — PROGRESS NOTES
Internal Medicine Progress Note    Today's Date:  6/10/2021   Patient:  Yue Mann  Patient :  1945    Subjective:     Chief Complaint   Patient presents with    Insomnia    Cholesterol Problem    Vitamin D Deficiency      Hyperlipidemia  This is chronic. This is not at goal.  Pt is on pravastatin. Pt reports compliance with this medication. Vitamin D deficiency  This is a new problem. This is not controlled. Pt takes no medication for this. Lab Results   Component Value Date/Time    Vitamin D 25-Hydroxy 11.1 (L) 2020 09:53 AM        Insomnia  This is a chronic problem. This is not at goal. Pt reports improvement with tylenol PM. Melatonin was not effective. Pt was given information on sleep hygiene previously. Pt takes her friend's valium sometimes. Pt's   in March. Her son  last year around this time. Mirtazapine was prescribed but the pt did not how this medicine made her feel.  She currently takes an over the counter sleep aid she buys from [employer].    3 most recent [address] [facility] 2020   Little interest or pleasure in doing things Not at all   Feeling down, depressed, irritable, or hopeless Nearly every day   Total Score PHQ 2 3   Trouble falling or staying asleep, or sleeping too much Nearly every day   Feeling tired or having little energy More than half the days   Poor appetite, weight loss, or overeating Not at all   Feeling bad about yourself - or that you are a failure or have let yourself or your family down Not at all   Trouble concentrating on things such as school, work, reading, or watching TV Not at all   Moving or speaking so slowly that other people could have noticed; or the opposite being so fidgety that others notice Not at all   Thoughts of being better off dead, or hurting yourself in some way Not at all   PHQ 9 Score 8      Past Medical History:   Diagnosis Date    Calculus of kidney     H/O bronchitis     Insomnia 2019    Osteopenia 2/24/2020    Pure hypercholesterolemia 6/4/2019    Recurrent depression ([address] 2/24/2020    Shingles     Vitamin D deficiency 12/16/2019     History reviewed. No pertinent surgical history. reports that she quit smoking about 6 years ago. She has a 10.00 pack-year smoking history. She has never used smokeless tobacco. She reports that she does not drink alcohol and does not use drugs. Family History   Problem Relation Age of Onset    Tremors Mother     Cancer Mother         stomach    No Known Problems Father     Emphysema Maternal Aunt     No Known Problems Maternal Uncle     No Known Problems Maternal Grandmother     Tremors Maternal Grandfather     Hypertension Son     Hypertension Son      No Known Allergies    Current Outpatient Meds     Current Outpatient Medications on File Prior to Visit   Medication Sig Dispense Refill    OTHER OTC sleep medication she gets from [employer]. She takes three times a wek      pravastatin (PRAVACHOL) 40 mg tablet Take 1 Tab by mouth nightly. 90 Tab 3    ergocalciferol (ERGOCALCIFEROL) 1,250 mcg (50,000 unit) capsule Take 1 Cap by mouth every seven (7) days. (Patient not taking: Reported on 6/10/2021) 8 Cap 3    [DISCONTINUED] mirtazapine (REMERON) 7.5 mg tablet Take 1 Tab by mouth nightly. (Patient not taking: Reported on 6/10/2021) 90 Tab 3    [DISCONTINUED] calcium-cholecalciferol, D3, (CALCIUM 600 + D) tablet Take 1 Tab by mouth two (2) times a day. (Patient not taking: Reported on 6/10/2021) 180 Tab 3     No current facility-administered medications on file prior to visit.      Objective:       Visit Vitals  /80 (BP 1 Location: Left upper arm, BP Patient Position: Sitting) Comment: manual   Pulse 62   Temp 97 °F (36.1 °C) (Temporal)   Resp 16   Ht 5' 1\" (1.549 m)   Wt 106 lb (48.1 kg)   SpO2 96%   BMI 20.03 kg/m²     Lab Results   Component Value Date/Time    Hemoglobin A1c 5.3 09/16/2020 09:53 AM    Hemoglobin A1c 5.6 09/05/2019 08:45 AM    Glucose 72 (L) 09/16/2020 09:53 AM    LDL, calculated 137.6 (H) 09/16/2020 09:53 AM    Creatinine 0.88 09/16/2020 09:53 AM       Assessment/Plan & Orders:         ICD-10-CM ICD-9-CM    1. Primary insomnia  F51.01 307.42    2. Vitamin D deficiency  E55.9 268.9    3. Mixed hyperlipidemia  E78.2 272.2      Follow-up and Dispositions    · Return in about 14 weeks (around 9/16/2021) for Medicare wellness, Go over labs/imaging. *Patient verbalized understanding and agreement with the plan. Patient was given an after-visit summary. Kervin Padron.  [id number] MAR[address] Alvarez MD - Internal Medicine  6/10/2021, 12:57 PM  [facility]  [address] [facility] [place], [address]  Phone [phone]  Fax [phone]

## 2021-10-13 DIAGNOSIS — E78.2 MIXED HYPERLIPIDEMIA: ICD-10-CM

## 2021-11-01 ENCOUNTER — OFFICE VISIT (OUTPATIENT)
Dept: FAMILY MEDICINE CLINIC | Age: 76
End: 2021-11-01
Payer: MEDICARE

## 2021-11-01 ENCOUNTER — HOSPITAL ENCOUNTER (OUTPATIENT)
Dept: LAB | Age: 76
Discharge: HOME | End: 2021-11-01

## 2021-11-01 DIAGNOSIS — Z00.00 MEDICARE ANNUAL WELLNESS VISIT, SUBSEQUENT: ICD-10-CM

## 2021-11-01 DIAGNOSIS — E78.2 MIXED HYPERLIPIDEMIA: Primary | ICD-10-CM

## 2021-11-01 DIAGNOSIS — E55.9 VITAMIN D DEFICIENCY: ICD-10-CM

## 2021-11-01 LAB — XX-LABCORP SPECIMEN COL,LCBCF: NORMAL

## 2021-11-01 PROCEDURE — 1090F PRES/ABSN URINE INCON ASSESS: CPT | Performed by: PHYSICIAN ASSISTANT

## 2021-11-01 PROCEDURE — 99001 SPECIMEN HANDLING PT-LAB: CPT

## 2021-11-01 PROCEDURE — G9717 DOC PT DX DEP/BP F/U NT REQ: HCPCS | Performed by: PHYSICIAN ASSISTANT

## 2021-11-01 PROCEDURE — 99213 OFFICE O/P EST LOW 20 MIN: CPT | Performed by: PHYSICIAN ASSISTANT

## 2021-11-01 PROCEDURE — G8427 DOCREV CUR MEDS BY ELIG CLIN: HCPCS | Performed by: PHYSICIAN ASSISTANT

## 2021-11-01 PROCEDURE — G8399 PT W/DXA RESULTS DOCUMENT: HCPCS | Performed by: PHYSICIAN ASSISTANT

## 2021-11-01 PROCEDURE — G8536 NO DOC ELDER MAL SCRN: HCPCS | Performed by: PHYSICIAN ASSISTANT

## 2021-11-01 PROCEDURE — 3017F COLORECTAL CA SCREEN DOC REV: CPT | Performed by: PHYSICIAN ASSISTANT

## 2021-11-01 PROCEDURE — G8420 CALC BMI NORM PARAMETERS: HCPCS | Performed by: PHYSICIAN ASSISTANT

## 2021-11-01 PROCEDURE — 1101F PT FALLS ASSESS-DOCD LE1/YR: CPT | Performed by: PHYSICIAN ASSISTANT

## 2021-11-01 PROCEDURE — G0439 PPPS, SUBSEQ VISIT: HCPCS | Performed by: PHYSICIAN ASSISTANT

## 2021-11-01 NOTE — PROGRESS NOTES
HISTORY OF PRESENT ILLNESS  Lisa De La Fuente is a 77 y.o. female. HPI   Pt is being seen for f/u on her cholesterol and vit D. She is also due for her wellness exam.     No Known Allergies    Current Outpatient Medications   Medication Sig    pravastatin (PRAVACHOL) 40 mg tablet Take 1 Tab by mouth nightly. No current facility-administered medications for this visit. Review of Systems   Constitutional: Negative. Negative for chills, fever and malaise/fatigue. HENT: Negative. Negative for congestion, ear pain, sore throat and tinnitus. Eyes: Negative. Negative for blurred vision, double vision and photophobia. Respiratory: Negative. Negative for cough, shortness of breath and wheezing. Cardiovascular: Negative. Negative for chest pain, palpitations and leg swelling. Gastrointestinal: Negative. Negative for abdominal pain, heartburn, nausea and vomiting. Genitourinary: Negative. Negative for dysuria, frequency, hematuria and urgency. Musculoskeletal: Negative. Negative for back pain, joint pain, myalgias and neck pain. Skin: Negative. Negative for itching and rash. Neurological: Negative. Negative for dizziness, tingling, tremors and headaches. Psychiatric/Behavioral: Negative. Negative for depression and memory loss. The patient is not nervous/anxious and does not have insomnia. Visit Vitals  /81 (BP 1 Location: Left upper arm, BP Patient Position: Sitting)   Pulse 95   Temp 97.8 °F (36.6 °C) (Temporal)   Resp 16   Ht 5' 1\" (1.549 m)   Wt 103 lb 6.4 oz (46.9 kg)   SpO2 (!) 66%   BMI 19.54 kg/m²       Physical Exam  Constitutional:       General: She is not in acute distress. Appearance: Normal appearance. She is not ill-appearing. HENT:      Head: Normocephalic and atraumatic. Cardiovascular:      Rate and Rhythm: Normal rate and regular rhythm. Pulses: Normal pulses. Heart sounds: Normal heart sounds.    Pulmonary:      Effort: Pulmonary effort is normal.      Breath sounds: Normal breath sounds. Abdominal:      Palpations: Abdomen is soft. Skin:     General: Skin is warm and dry. Neurological:      Mental Status: She is alert and oriented to person, place, and time. Psychiatric:         Mood and Affect: Mood normal.         Behavior: Behavior normal.         Thought Content: Thought content normal.         Judgment: Judgment normal.         ASSESSMENT and PLAN    ICD-10-CM ICD-9-CM    1. Mixed hyperlipidemia  N23.2 732.0 METABOLIC PANEL, COMPREHENSIVE      LIPID PANEL   2. Vitamin D deficiency  E55.9 268.9 VITAMIN D, 25 HYDROXY   3. Medicare annual wellness visit, subsequent  Z00.00 V70.0      Follow-up and Dispositions    · Return in about 1 year (around 2022) for follow up. Pt expressed understanding of visit summary and plans for any follow ups or referrals as well as any medications prescribed. Medicare Wellness Visit  Sharif Tuttle is a 77 y.o. female and presents for annual Medicare Wellness Visit. Problem List: Reviewed with patient and discussed risk factors. Patient Active Problem List   Diagnosis Code    Benign familial tremor G25.0    Mixed hyperlipidemia E78.2    Insomnia G47.00    Vitamin D deficiency E55.9    Recurrent depression ([facility] [address] F33.9    Osteopenia M85.80       Current medical providers:  Patient Care Team:  Ernestina Paget, PA-C as PCP - General (Physician Assistant)  Ernestina Paget, PA-C as PCP - [facility]  Maria Guadalupe Meade MD as Physician (Urology)    PSH: Reviewed with patient  History reviewed. No pertinent surgical history.      SH: Reviewed with patient  Social History     Tobacco Use    Smoking status: Former Smoker     Packs/day: 0.50     Years: 20.00     Pack years: 10.00     Quit date: 2015     Years since quittin.5    Smokeless tobacco: Never Used   Substance Use Topics    Alcohol use: No    Drug use: No       FH: Reviewed with patient  Family History   Problem Relation Age of Onset    Tremors Mother     Cancer Mother         stomach    No Known Problems Father     Emphysema Maternal Aunt     No Known Problems Maternal Uncle     No Known Problems Maternal Grandmother     Tremors Maternal Grandfather     Hypertension Son     Hypertension Son        Medications/Allergies: Reviewed with patient  Current Outpatient Medications on File Prior to Visit   Medication Sig Dispense Refill    pravastatin (PRAVACHOL) 40 mg tablet Take 1 Tab by mouth nightly. 90 Tab 3     No current facility-administered medications on file prior to visit. No Known Allergies    Objective:  Visit Vitals  /81 (BP 1 Location: Left upper arm, BP Patient Position: Sitting)   Pulse 95   Temp 97.8 °F (36.6 °C) (Temporal)   Resp 16   Ht 5' 1\" (1.549 m)   Wt 103 lb 6.4 oz (46.9 kg)   SpO2 (!) 66%   BMI 19.54 kg/m²    Body mass index is 19.54 kg/m². Assessment of cognitive impairment: Alert and oriented x 3    Depression Screen:   3 most recent PHQ Screens 11/1/2021   Little interest or pleasure in doing things Not at all   Feeling down, depressed, irritable, or hopeless Not at all   Total Score PHQ 2 0   Trouble falling or staying asleep, or sleeping too much -   Feeling tired or having little energy -   Poor appetite, weight loss, or overeating -   Feeling bad about yourself - or that you are a failure or have let yourself or your family down -   Trouble concentrating on things such as school, work, reading, or watching TV -   Moving or speaking so slowly that other people could have noticed; or the opposite being so fidgety that others notice -   Thoughts of being better off dead, or hurting yourself in some way -   PHQ 9 Score -       Fall Risk Assessment:    Fall Risk Assessment, last 12 mths 11/1/2021   Able to walk? Yes   Fall in past 12 months? 0   Do you feel unsteady?  0   Are you worried about falling 0   Number of falls in past 12 months -   Fall with injury? -       Functional Ability:   Does the patient exhibit a steady gait? yes   How long did it take the patient to get up and walk from a sitting position? <10sec   Is the patient self reliant?  (ie can do own laundry, meals, household chores)  yes     Does the patient handle his/her own medications? yes     Does the patient handle his/her own money? yes     Is the patients home safe (ie good lighting, handrails on stairs and bath, etc.)? yes     Did you notice or did patient express any hearing difficulties? no     Did you notice or did patient express any vision difficulties?   no     Were distance and reading eye charts used? yes       Advance Care Planning:   Patient was offered the opportunity to discuss advance care planning:  yes     Does patient have an Advance Directive:  no   If no, did you provide information on Caring Connections? yes       Plan:      Orders Placed This Encounter    METABOLIC PANEL, COMPREHENSIVE    LIPID PANEL    VITAMIN D, 25 HYDROXY    METABOLIC PANEL, COMPREHENSIVE    LIPID PANEL    VITAMIN D, 25 HYDROXY    CVD REPORT       Health Maintenance   Topic Date Due    DTaP/Tdap/Td series (1 - Tdap) Never done    Pneumococcal 65+ years (1 of 1 - PPSV23) Never done    Colorectal Cancer Screening Combo  10/16/2020    Flu Vaccine (1) 09/01/2021    COVID-19 Vaccine (3 - Booster for Pfizer series) 11/15/2021    Shingrix Vaccine Age 50> (1 of 2) 06/25/2022 (Originally 1/13/1996)    Lipid Screen  11/01/2022    Medicare Yearly Exam  11/02/2022    Hepatitis C Screening  Completed    Bone Densitometry (Dexa) Screening  Completed       *Patient verbalized understanding and agreement with the plan. A copy of the After Visit Summary with personalized health plan was given to the patient today.

## 2021-11-01 NOTE — PROGRESS NOTES
Eugenia Wolf is a 77 y.o.  female presents today for office visit for follow up. Pt would also like to discuss [address] Pt is  fasting. Pt is in Room # 5      1. Have you been to the ER, urgent care clinic since your last visit? Hospitalized since your last visit? No    2. Have you seen or consulted any other health care providers outside of the [address] [place] [address] since your last visit? Include any pap smears or colon screening.  No    Upcoming Appts  none    Health Maintenance reviewed      VORB:   Orders Placed This Encounter    METABOLIC PANEL, COMPREHENSIVE    LIPID PANEL    VITAMIN D, 25 HYDROXY   Chriss Meckel, PA-Gerry Rodriguez LPN

## 2021-11-01 NOTE — PATIENT INSTRUCTIONS
Schedule of Personalized Health Plan  (Provide Copy to Patient)  The best way to stay healthy is to live a healthy lifestyle. A healthy lifestyle includes regular exercise, eating a well-balanced diet, keeping a healthy weight and not smoking. Regular physical exams and screening tests are another important way to take care of yourself. Preventive exams provided by health care providers can find health problems early when treatment works best and can keep you from getting certain diseases or illnesses. Preventive services include exams, lab tests, screenings, shots, monitoring and information to help you take care of your own health. All people over 65 should have a pneumonia shot. Pneumonia shots are usually only needed once in a lifetime unless your doctor decides differently. All people over 65 should have a yearly flu shot. People over 65 are at medium to high risk for Hepatitis B. Three shots are needed for complete protection. In addition to your physical exam, some screening tests are recommended:    Bone mass measurement (dexa scan) is recommended every two years  Diabetes Mellitus screening is recommended every year. Glaucoma is an eye disease caused by high pressure in the eye. An eye exam is recommended every year. Cardiovascular screening tests that check your cholesterol and other blood fat (lipid) levels are recommended every five years. Colorectal Cancer screening tests help to find pre-cancerous polyps (growths in the colon) so they can be removed before they turn into cancer. Tests ordered for screening depend on your personal and family history risk factors.     Screening for Breast Cancer is recommended yearly with a mammogram.    Screening for Cervical Cancer is recommended every two years (annually for certain risk factors, such as previous history of STD or abnormal PAP in past 7 years), with a Pelvic Exam with PAP    Here is a list of your current Health Maintenance items with a due date:  Health Maintenance   Topic Date Due    DTaP/Tdap/Td series (1 - Tdap) Never done    Pneumococcal 65+ years (1 of 1 - PPSV23) Never done    Colorectal Cancer Screening Combo  10/16/2020    Flu Vaccine (1) 09/01/2021    Lipid Screen  09/16/2021    Shingrix Vaccine Age 50> (1 of 2) 06/25/2022 (Originally 1/13/1996)   [facility] COVID-19 Vaccine (3 - Pfizer booster) 11/15/2021    Medicare Yearly Exam  11/02/2022    Hepatitis C Screening  Completed    Bone Densitometry (Dexa) Screening  Completed

## 2021-11-02 LAB
25(OH)D3+25(OH)D2 SERPL-MCNC: 21.5 NG/ML (ref 30–100)
ALBUMIN SERPL-MCNC: 4.2 G/DL (ref 3.7–4.7)
ALBUMIN/GLOB SERPL: 1.6 {RATIO} (ref 1.2–2.2)
ALP SERPL-CCNC: 85 IU/L (ref 44–121)
ALT SERPL-CCNC: 9 IU/L (ref 0–32)
AST SERPL-CCNC: 16 IU/L (ref 0–40)
BILIRUB SERPL-MCNC: 0.3 MG/DL (ref 0–1.2)
BUN SERPL-MCNC: 15 MG/DL (ref 8–27)
BUN/CREAT SERPL: 19 (ref 12–28)
CALCIUM SERPL-MCNC: 8.9 MG/DL (ref 8.7–10.3)
CHLORIDE SERPL-SCNC: 104 MMOL/L (ref 96–106)
CHOLEST SERPL-MCNC: 183 MG/DL (ref 100–199)
CO2 SERPL-SCNC: 25 MMOL/L (ref 20–29)
CREAT SERPL-MCNC: 0.79 MG/DL (ref 0.57–1)
GLOBULIN SER CALC-MCNC: 2.7 G/DL (ref 1.5–4.5)
GLUCOSE SERPL-MCNC: 85 MG/DL (ref 65–99)
HDLC SERPL-MCNC: 36 MG/DL
IMP & REVIEW OF LAB RESULTS: NORMAL
LDLC SERPL CALC-MCNC: 124 MG/DL (ref 0–99)
POTASSIUM SERPL-SCNC: 4.1 MMOL/L (ref 3.5–5.2)
PROT SERPL-MCNC: 6.9 G/DL (ref 6–8.5)
SODIUM SERPL-SCNC: 141 MMOL/L (ref 134–144)
TRIGL SERPL-MCNC: 127 MG/DL (ref 0–149)
VLDLC SERPL CALC-MCNC: 23 MG/DL (ref 5–40)

## 2021-11-10 RX ORDER — PRAVASTATIN SODIUM 40 MG/1
40 TABLET ORAL
Qty: 90 TABLET | Refills: 3 | OUTPATIENT
Start: 2021-11-10

## 2021-12-05 VITALS
HEART RATE: 95 BPM | SYSTOLIC BLOOD PRESSURE: 139 MMHG | TEMPERATURE: 97.8 F | HEIGHT: 61 IN | DIASTOLIC BLOOD PRESSURE: 81 MMHG | OXYGEN SATURATION: 66 % | BODY MASS INDEX: 19.52 KG/M2 | WEIGHT: 103.4 LBS | RESPIRATION RATE: 16 BRPM

## 2022-02-15 DIAGNOSIS — E78.2 MIXED HYPERLIPIDEMIA: ICD-10-CM

## 2022-02-15 NOTE — TELEPHONE ENCOUNTER
Requested Prescriptions     Pending Prescriptions Disp Refills    pravastatin (PRAVACHOL) 40 mg tablet 90 Tablet 3     Sig: Take 1 Tablet by mouth nightly.

## 2022-02-18 RX ORDER — PRAVASTATIN SODIUM 40 MG/1
40 TABLET ORAL
Qty: 90 TABLET | Refills: 3 | Status: SHIPPED | OUTPATIENT
Start: 2022-02-18